# Patient Record
Sex: FEMALE | Race: BLACK OR AFRICAN AMERICAN | URBAN - METROPOLITAN AREA
[De-identification: names, ages, dates, MRNs, and addresses within clinical notes are randomized per-mention and may not be internally consistent; named-entity substitution may affect disease eponyms.]

---

## 2022-10-04 PROCEDURE — 36415 COLL VENOUS BLD VENIPUNCTURE: CPT

## 2022-10-04 PROCEDURE — 99284 EMERGENCY DEPT VISIT MOD MDM: CPT

## 2022-10-05 ENCOUNTER — APPOINTMENT (OUTPATIENT)
Dept: ULTRASOUND IMAGING | Age: 37
End: 2022-10-05
Payer: MEDICAID

## 2022-10-05 ENCOUNTER — HOSPITAL ENCOUNTER (EMERGENCY)
Age: 37
Discharge: HOME | End: 2022-10-05
Attending: EMERGENCY MEDICINE
Payer: MEDICAID

## 2022-10-05 VITALS
HEIGHT: 65 IN | WEIGHT: 228 LBS | RESPIRATION RATE: 16 BRPM | SYSTOLIC BLOOD PRESSURE: 103 MMHG | TEMPERATURE: 97 F | HEART RATE: 88 BPM | DIASTOLIC BLOOD PRESSURE: 65 MMHG | OXYGEN SATURATION: 98 % | BODY MASS INDEX: 37.99 KG/M2

## 2022-10-05 DIAGNOSIS — N93.9 VAGINAL BLEEDING: Primary | ICD-10-CM

## 2022-10-05 LAB
ALBUMIN SERPL-MCNC: 3.5 G/DL (ref 3.4–5)
ALBUMIN/GLOB SERPL: 1 {RATIO} (ref 1–2)
ALP LIVER SERPL-CCNC: 76 U/L
ALT SERPL-CCNC: 21 U/L
ANION GAP SERPL CALC-SCNC: 1 MMOL/L (ref 0–18)
AST SERPL-CCNC: 12 U/L (ref 15–37)
B-HCG SERPL-ACNC: <1 MIU/ML
BASOPHILS # BLD AUTO: 0.03 X10(3) UL (ref 0–0.2)
BASOPHILS NFR BLD AUTO: 0.4 %
BILIRUB SERPL-MCNC: 0.3 MG/DL (ref 0.1–2)
BILIRUB UR QL STRIP.AUTO: NEGATIVE
BUN BLD-MCNC: 11 MG/DL (ref 7–18)
CALCIUM BLD-MCNC: 9.2 MG/DL (ref 8.5–10.1)
CHLORIDE SERPL-SCNC: 106 MMOL/L (ref 98–112)
CLARITY UR REFRACT.AUTO: CLEAR
CO2 SERPL-SCNC: 31 MMOL/L (ref 21–32)
COLOR UR AUTO: YELLOW
CREAT BLD-MCNC: 0.72 MG/DL
EOSINOPHIL # BLD AUTO: 0.1 X10(3) UL (ref 0–0.7)
EOSINOPHIL NFR BLD AUTO: 1.5 %
ERYTHROCYTE [DISTWIDTH] IN BLOOD BY AUTOMATED COUNT: 13.2 %
GFR SERPLBLD BASED ON 1.73 SQ M-ARVRAT: 109 ML/MIN/1.73M2 (ref 60–?)
GLOBULIN PLAS-MCNC: 3.5 G/DL (ref 2.8–4.4)
GLUCOSE BLD-MCNC: 125 MG/DL (ref 70–99)
GLUCOSE UR STRIP.AUTO-MCNC: NEGATIVE MG/DL
HCT VFR BLD AUTO: 37.7 %
HGB BLD-MCNC: 12.1 G/DL
IMM GRANULOCYTES # BLD AUTO: 0.01 X10(3) UL (ref 0–1)
IMM GRANULOCYTES NFR BLD: 0.1 %
KETONES UR STRIP.AUTO-MCNC: NEGATIVE MG/DL
LEUKOCYTE ESTERASE UR QL STRIP.AUTO: NEGATIVE
LYMPHOCYTES # BLD AUTO: 3.03 X10(3) UL (ref 1–4)
LYMPHOCYTES NFR BLD AUTO: 44.3 %
MCH RBC QN AUTO: 25.7 PG (ref 26–34)
MCHC RBC AUTO-ENTMCNC: 32.1 G/DL (ref 31–37)
MCV RBC AUTO: 80.2 FL
MONOCYTES # BLD AUTO: 0.52 X10(3) UL (ref 0.1–1)
MONOCYTES NFR BLD AUTO: 7.6 %
NEUTROPHILS # BLD AUTO: 3.15 X10 (3) UL (ref 1.5–7.7)
NEUTROPHILS # BLD AUTO: 3.15 X10(3) UL (ref 1.5–7.7)
NEUTROPHILS NFR BLD AUTO: 46.1 %
NITRITE UR QL STRIP.AUTO: NEGATIVE
OSMOLALITY SERPL CALC.SUM OF ELEC: 287 MOSM/KG (ref 275–295)
PH UR STRIP.AUTO: 7 [PH] (ref 5–8)
PLATELET # BLD AUTO: 251 10(3)UL (ref 150–450)
POTASSIUM SERPL-SCNC: 4.1 MMOL/L (ref 3.5–5.1)
PROT SERPL-MCNC: 7 G/DL (ref 6.4–8.2)
PROT UR STRIP.AUTO-MCNC: NEGATIVE MG/DL
RBC # BLD AUTO: 4.7 X10(6)UL
RBC #/AREA URNS AUTO: >10 /HPF
RH BLOOD TYPE: POSITIVE
SODIUM SERPL-SCNC: 138 MMOL/L (ref 136–145)
SP GR UR STRIP.AUTO: 1.02 (ref 1–1.03)
UROBILINOGEN UR STRIP.AUTO-MCNC: 0.2 MG/DL
WBC # BLD AUTO: 6.8 X10(3) UL (ref 4–11)

## 2022-10-05 PROCEDURE — 81015 MICROSCOPIC EXAM OF URINE: CPT

## 2022-10-05 PROCEDURE — 76801 OB US < 14 WKS SINGLE FETUS: CPT

## 2022-10-05 PROCEDURE — 86900 BLOOD TYPING SEROLOGIC ABO: CPT | Performed by: EMERGENCY MEDICINE

## 2022-10-05 PROCEDURE — 76817 TRANSVAGINAL US OBSTETRIC: CPT

## 2022-10-05 PROCEDURE — 86901 BLOOD TYPING SEROLOGIC RH(D): CPT | Performed by: EMERGENCY MEDICINE

## 2022-10-05 PROCEDURE — 81001 URINALYSIS AUTO W/SCOPE: CPT

## 2022-10-05 PROCEDURE — 85025 COMPLETE CBC W/AUTO DIFF WBC: CPT | Performed by: EMERGENCY MEDICINE

## 2022-10-05 PROCEDURE — 84702 CHORIONIC GONADOTROPIN TEST: CPT | Performed by: EMERGENCY MEDICINE

## 2022-10-05 PROCEDURE — 81001 URINALYSIS AUTO W/SCOPE: CPT | Performed by: EMERGENCY MEDICINE

## 2022-10-05 PROCEDURE — 80053 COMPREHEN METABOLIC PANEL: CPT | Performed by: EMERGENCY MEDICINE

## 2022-10-05 NOTE — ED INITIAL ASSESSMENT (HPI)
Pt to ed with c/o vaginal bleeding; blood noted when wiping, and  right quadrant pain. Pt may be 4-5 weeks pregnant.    PT with hx of ectopic

## 2022-10-27 ENCOUNTER — APPOINTMENT (OUTPATIENT)
Dept: CT IMAGING | Age: 37
End: 2022-10-27
Attending: EMERGENCY MEDICINE
Payer: MEDICAID

## 2022-10-27 ENCOUNTER — HOSPITAL ENCOUNTER (EMERGENCY)
Age: 37
Discharge: HOME | End: 2022-10-27
Attending: EMERGENCY MEDICINE
Payer: MEDICAID

## 2022-10-27 ENCOUNTER — APPOINTMENT (OUTPATIENT)
Dept: ULTRASOUND IMAGING | Age: 37
End: 2022-10-27
Attending: PHYSICIAN ASSISTANT
Payer: MEDICAID

## 2022-10-27 VITALS
DIASTOLIC BLOOD PRESSURE: 79 MMHG | HEART RATE: 79 BPM | BODY MASS INDEX: 37.49 KG/M2 | OXYGEN SATURATION: 99 % | SYSTOLIC BLOOD PRESSURE: 118 MMHG | WEIGHT: 225 LBS | RESPIRATION RATE: 18 BRPM | HEIGHT: 65 IN | TEMPERATURE: 98 F

## 2022-10-27 DIAGNOSIS — R10.9 ABDOMINAL PAIN OF UNKNOWN ETIOLOGY: Primary | ICD-10-CM

## 2022-10-27 LAB
ALBUMIN SERPL-MCNC: 3.8 G/DL (ref 3.4–5)
ALBUMIN/GLOB SERPL: 1 {RATIO} (ref 1–2)
ALP LIVER SERPL-CCNC: 71 U/L
ALT SERPL-CCNC: 21 U/L
ANION GAP SERPL CALC-SCNC: 7 MMOL/L (ref 0–18)
AST SERPL-CCNC: 14 U/L (ref 15–37)
B-HCG UR QL: NEGATIVE
BASOPHILS # BLD AUTO: 0.03 X10(3) UL (ref 0–0.2)
BASOPHILS NFR BLD AUTO: 0.4 %
BILIRUB SERPL-MCNC: 0.3 MG/DL (ref 0.1–2)
BILIRUB UR QL STRIP.AUTO: NEGATIVE
BUN BLD-MCNC: 13 MG/DL (ref 7–18)
CALCIUM BLD-MCNC: 9.5 MG/DL (ref 8.5–10.1)
CHLORIDE SERPL-SCNC: 104 MMOL/L (ref 98–112)
CLARITY UR REFRACT.AUTO: CLEAR
CO2 SERPL-SCNC: 26 MMOL/L (ref 21–32)
COLOR UR AUTO: YELLOW
CREAT BLD-MCNC: 0.86 MG/DL
EOSINOPHIL # BLD AUTO: 0.06 X10(3) UL (ref 0–0.7)
EOSINOPHIL NFR BLD AUTO: 0.7 %
ERYTHROCYTE [DISTWIDTH] IN BLOOD BY AUTOMATED COUNT: 13.2 %
GFR SERPLBLD BASED ON 1.73 SQ M-ARVRAT: 88 ML/MIN/1.73M2 (ref 60–?)
GLOBULIN PLAS-MCNC: 3.8 G/DL (ref 2.8–4.4)
GLUCOSE BLD-MCNC: 91 MG/DL (ref 70–99)
GLUCOSE UR STRIP.AUTO-MCNC: NEGATIVE MG/DL
HCT VFR BLD AUTO: 39.2 %
HGB BLD-MCNC: 12.7 G/DL
IMM GRANULOCYTES # BLD AUTO: 0.04 X10(3) UL (ref 0–1)
IMM GRANULOCYTES NFR BLD: 0.5 %
KETONES UR STRIP.AUTO-MCNC: NEGATIVE MG/DL
LEUKOCYTE ESTERASE UR QL STRIP.AUTO: NEGATIVE
LIPASE SERPL-CCNC: 215 U/L (ref 73–393)
LYMPHOCYTES # BLD AUTO: 3.68 X10(3) UL (ref 1–4)
LYMPHOCYTES NFR BLD AUTO: 43.2 %
MCH RBC QN AUTO: 25.9 PG (ref 26–34)
MCHC RBC AUTO-ENTMCNC: 32.4 G/DL (ref 31–37)
MCV RBC AUTO: 80 FL
MONOCYTES # BLD AUTO: 0.73 X10(3) UL (ref 0.1–1)
MONOCYTES NFR BLD AUTO: 8.6 %
NEUTROPHILS # BLD AUTO: 3.97 X10 (3) UL (ref 1.5–7.7)
NEUTROPHILS # BLD AUTO: 3.97 X10(3) UL (ref 1.5–7.7)
NEUTROPHILS NFR BLD AUTO: 46.6 %
NITRITE UR QL STRIP.AUTO: NEGATIVE
OSMOLALITY SERPL CALC.SUM OF ELEC: 284 MOSM/KG (ref 275–295)
PH UR STRIP.AUTO: 6.5 [PH] (ref 5–8)
PLATELET # BLD AUTO: 269 10(3)UL (ref 150–450)
POTASSIUM SERPL-SCNC: 3.9 MMOL/L (ref 3.5–5.1)
PROT SERPL-MCNC: 7.6 G/DL (ref 6.4–8.2)
PROT UR STRIP.AUTO-MCNC: NEGATIVE MG/DL
RBC # BLD AUTO: 4.9 X10(6)UL
RBC UR QL AUTO: NEGATIVE
SODIUM SERPL-SCNC: 137 MMOL/L (ref 136–145)
SP GR UR STRIP.AUTO: 1.02 (ref 1–1.03)
UROBILINOGEN UR STRIP.AUTO-MCNC: 0.2 MG/DL
WBC # BLD AUTO: 8.5 X10(3) UL (ref 4–11)

## 2022-10-27 PROCEDURE — 76700 US EXAM ABDOM COMPLETE: CPT | Performed by: PHYSICIAN ASSISTANT

## 2022-10-27 PROCEDURE — 99284 EMERGENCY DEPT VISIT MOD MDM: CPT

## 2022-10-27 PROCEDURE — 81003 URINALYSIS AUTO W/O SCOPE: CPT

## 2022-10-27 PROCEDURE — 80053 COMPREHEN METABOLIC PANEL: CPT | Performed by: PHYSICIAN ASSISTANT

## 2022-10-27 PROCEDURE — 74177 CT ABD & PELVIS W/CONTRAST: CPT | Performed by: EMERGENCY MEDICINE

## 2022-10-27 PROCEDURE — 81003 URINALYSIS AUTO W/O SCOPE: CPT | Performed by: EMERGENCY MEDICINE

## 2022-10-27 PROCEDURE — 96361 HYDRATE IV INFUSION ADD-ON: CPT

## 2022-10-27 PROCEDURE — 83690 ASSAY OF LIPASE: CPT | Performed by: PHYSICIAN ASSISTANT

## 2022-10-27 PROCEDURE — 81025 URINE PREGNANCY TEST: CPT

## 2022-10-27 PROCEDURE — 96374 THER/PROPH/DIAG INJ IV PUSH: CPT

## 2022-10-27 PROCEDURE — 85025 COMPLETE CBC W/AUTO DIFF WBC: CPT | Performed by: PHYSICIAN ASSISTANT

## 2022-10-27 RX ORDER — DICYCLOMINE HCL 20 MG
20 TABLET ORAL 4 TIMES DAILY PRN
Qty: 15 TABLET | Refills: 0 | Status: SHIPPED | OUTPATIENT
Start: 2022-10-27 | End: 2022-10-29

## 2022-10-27 RX ORDER — MORPHINE SULFATE 4 MG/ML
4 INJECTION, SOLUTION INTRAMUSCULAR; INTRAVENOUS ONCE
Status: COMPLETED | OUTPATIENT
Start: 2022-10-27 | End: 2022-10-27

## 2022-10-27 NOTE — ED INITIAL ASSESSMENT (HPI)
Pt presents to ed with c/o RLQ pain for the past 3 weeks. States it has progressively getting worse and was so bad today \"it brought me to my knees. \" denies n/v/d. Also reports urinary frequency.

## 2022-10-28 NOTE — DISCHARGE INSTRUCTIONS
Tylenol or Advil for as needed for pain take the Bentyl as needed.   Follow-up with your doctor over the next 2 days drink plenty fluids return if worse peer

## 2023-04-03 ENCOUNTER — OFFICE VISIT (OUTPATIENT)
Dept: FAMILY MEDICINE CLINIC | Facility: CLINIC | Age: 38
End: 2023-04-03
Payer: MEDICAID

## 2023-04-03 VITALS
SYSTOLIC BLOOD PRESSURE: 116 MMHG | HEIGHT: 63.78 IN | BODY MASS INDEX: 35.95 KG/M2 | DIASTOLIC BLOOD PRESSURE: 78 MMHG | WEIGHT: 208 LBS | TEMPERATURE: 97 F | HEART RATE: 98 BPM | OXYGEN SATURATION: 99 %

## 2023-04-03 DIAGNOSIS — Z01.419 ENCOUNTER FOR ROUTINE GYNECOLOGICAL EXAMINATION WITH PAPANICOLAOU SMEAR OF CERVIX: Primary | ICD-10-CM

## 2023-04-03 DIAGNOSIS — Z80.0 FAMILY HISTORY OF COLON CANCER: ICD-10-CM

## 2023-04-03 DIAGNOSIS — Z11.51 SCREENING FOR HPV (HUMAN PAPILLOMAVIRUS): ICD-10-CM

## 2023-04-03 DIAGNOSIS — E66.01 CLASS 2 SEVERE OBESITY DUE TO EXCESS CALORIES WITH SERIOUS COMORBIDITY AND BODY MASS INDEX (BMI) OF 35.0 TO 35.9 IN ADULT (HCC): ICD-10-CM

## 2023-04-03 DIAGNOSIS — Z12.4 SCREENING FOR CERVICAL CANCER: ICD-10-CM

## 2023-04-03 DIAGNOSIS — K76.0 FATTY LIVER: ICD-10-CM

## 2023-04-03 DIAGNOSIS — Z00.00 ROUTINE GENERAL MEDICAL EXAMINATION AT A HEALTH CARE FACILITY: ICD-10-CM

## 2023-04-03 PROBLEM — Z98.890 HISTORY OF LOOP ELECTRICAL EXCISION PROCEDURE (LEEP): Status: RESOLVED | Noted: 2023-04-03 | Resolved: 2023-04-03

## 2023-04-03 PROBLEM — Z98.890 HISTORY OF LOOP ELECTRICAL EXCISION PROCEDURE (LEEP): Status: ACTIVE | Noted: 2023-04-03

## 2023-04-03 PROBLEM — E66.812 CLASS 2 SEVERE OBESITY DUE TO EXCESS CALORIES WITH SERIOUS COMORBIDITY AND BODY MASS INDEX (BMI) OF 35.0 TO 35.9 IN ADULT (HCC): Status: ACTIVE | Noted: 2023-04-03

## 2023-04-03 PROCEDURE — 3078F DIAST BP <80 MM HG: CPT | Performed by: FAMILY MEDICINE

## 2023-04-03 PROCEDURE — 3074F SYST BP LT 130 MM HG: CPT | Performed by: FAMILY MEDICINE

## 2023-04-03 PROCEDURE — 3008F BODY MASS INDEX DOCD: CPT | Performed by: FAMILY MEDICINE

## 2023-04-03 PROCEDURE — 87624 HPV HI-RISK TYP POOLED RSLT: CPT | Performed by: FAMILY MEDICINE

## 2023-04-03 PROCEDURE — 99385 PREV VISIT NEW AGE 18-39: CPT | Performed by: FAMILY MEDICINE

## 2023-04-04 LAB — HPV I/H RISK 1 DNA SPEC QL NAA+PROBE: NEGATIVE

## 2023-06-15 ENCOUNTER — OFFICE VISIT (OUTPATIENT)
Dept: GASTROENTEROLOGY | Facility: CLINIC | Age: 38
End: 2023-06-15

## 2023-06-15 VITALS
WEIGHT: 209 LBS | BODY MASS INDEX: 37.03 KG/M2 | HEART RATE: 101 BPM | SYSTOLIC BLOOD PRESSURE: 129 MMHG | DIASTOLIC BLOOD PRESSURE: 87 MMHG | HEIGHT: 63 IN

## 2023-06-15 DIAGNOSIS — R10.11 RUQ PAIN: ICD-10-CM

## 2023-06-15 DIAGNOSIS — R93.89 ABNORMAL ULTRASOUND: ICD-10-CM

## 2023-06-15 DIAGNOSIS — K59.00 CONSTIPATION, UNSPECIFIED CONSTIPATION TYPE: Primary | ICD-10-CM

## 2023-06-15 PROCEDURE — 3074F SYST BP LT 130 MM HG: CPT | Performed by: NURSE PRACTITIONER

## 2023-06-15 PROCEDURE — 99244 OFF/OP CNSLTJ NEW/EST MOD 40: CPT | Performed by: NURSE PRACTITIONER

## 2023-06-15 PROCEDURE — 3008F BODY MASS INDEX DOCD: CPT | Performed by: NURSE PRACTITIONER

## 2023-06-15 PROCEDURE — 3079F DIAST BP 80-89 MM HG: CPT | Performed by: NURSE PRACTITIONER

## 2023-06-15 RX ORDER — MULTIVITAMIN
TABLET ORAL
COMMUNITY

## 2023-06-15 RX ORDER — DICYCLOMINE HCL 20 MG
TABLET ORAL
COMMUNITY

## 2023-06-15 RX ORDER — ALBUTEROL SULFATE 90 UG/1
AEROSOL, METERED RESPIRATORY (INHALATION)
COMMUNITY

## 2023-06-15 RX ORDER — PHENTERMINE HYDROCHLORIDE 37.5 MG/1
TABLET ORAL
COMMUNITY
Start: 2023-04-13

## 2023-06-15 RX ORDER — DESOGESTREL AND ETHINYL ESTRADIOL 0.15-0.03
KIT ORAL
COMMUNITY

## 2023-06-15 RX ORDER — PANTOPRAZOLE SODIUM 40 MG/1
40 TABLET, DELAYED RELEASE ORAL DAILY
Qty: 30 TABLET | Refills: 5 | Status: SHIPPED | OUTPATIENT
Start: 2023-06-15 | End: 2023-07-15

## 2023-06-15 RX ORDER — IBUPROFEN 800 MG
TABLET ORAL
COMMUNITY

## 2023-07-02 ENCOUNTER — HOSPITAL ENCOUNTER (OUTPATIENT)
Dept: MRI IMAGING | Age: 38
End: 2023-07-02
Attending: NURSE PRACTITIONER
Payer: MEDICAID

## 2023-07-02 ENCOUNTER — HOSPITAL ENCOUNTER (OUTPATIENT)
Dept: MRI IMAGING | Age: 38
Discharge: HOME | End: 2023-07-02
Attending: NURSE PRACTITIONER
Payer: MEDICAID

## 2023-07-02 DIAGNOSIS — R10.11 RUQ PAIN: ICD-10-CM

## 2023-07-02 DIAGNOSIS — R93.89 ABNORMAL ULTRASOUND: ICD-10-CM

## 2023-07-02 PROCEDURE — 74183 MRI ABD W/O CNTR FLWD CNTR: CPT | Performed by: NURSE PRACTITIONER

## 2023-07-02 PROCEDURE — A9581 GADOXETATE DISODIUM INJ: HCPCS | Performed by: NURSE PRACTITIONER

## 2023-07-05 ENCOUNTER — PATIENT MESSAGE (OUTPATIENT)
Dept: GASTROENTEROLOGY | Facility: CLINIC | Age: 38
End: 2023-07-05

## 2023-07-05 NOTE — TELEPHONE ENCOUNTER
From: Matilda Mantilla  To: Shila Renee.  Federico Burden  Sent: 7/5/2023 5:35 PM CDT  Subject: Urgent     Hello I'm not sure if Im suppose message you or Ms David Kan but can someone please reach out to me in regards to my results from my MRI I would greatly appreciate it thank you

## 2023-07-11 ENCOUNTER — TELEPHONE (OUTPATIENT)
Facility: CLINIC | Age: 38
End: 2023-07-11

## 2023-07-11 NOTE — TELEPHONE ENCOUNTER
Message sent to pt outreach to repeat US per APN recommendations below. Nursing:  Please place recall for ultrasound liver 4 mos 11/2023.      Thanks,  [facility]

## 2023-07-11 NOTE — TELEPHONE ENCOUNTER
Mri abdomen 7/2/23    Impression  CONCLUSION:    1. There is a smoothly marginated enhancing nodule within the lateral segment of the left lobe anteriorly measuring 1.5 x 1.4 x 1.7 cm. The lesion retains contrast into the 20 minutes delayed hepatocellular phase imaging and is consistent with a benign  process, most likely representing either a benign adenoma or FNH. Please see the body of the report above for further description regarding this lesion. 2. No additional hepatic lesions. No evidence of fatty infiltration or hepatocellular disease. LOCATION:  [facility]        Dictated by (CST): Ilana Page, DO on 7/03/2023   at 8:22 AM       Lesion consistent with a benign  process, most likely representing either a benign adenoma or FNH. Lfts normal 10/27/22. Repeat ordered, but she has not gone to have labs drawn. She reports on-going intermittent ruq as well as luq pain. Pain can be post-prandial, working out, at random. She has increased fiber in her diet and has been having bm every 1-2 days. She is not sure if pain effected by bm. She has been on birth control x last 15 years and stopped last year in July. Recommend:  Labs  Hp testing  Use miralax along with fiber supplement for constipation  Plan for surveillance imaging in approx 4 mos and rtc  Consider need for further testing/referral pending results of testing and sx       She verbalizes understanding and is in agreement with the plan.

## 2023-07-27 ENCOUNTER — LAB ENCOUNTER (OUTPATIENT)
Dept: LAB | Age: 38
End: 2023-07-27
Attending: NURSE PRACTITIONER
Payer: MEDICAID

## 2023-07-27 DIAGNOSIS — K76.0 FATTY LIVER: ICD-10-CM

## 2023-07-27 DIAGNOSIS — R10.11 RUQ PAIN: ICD-10-CM

## 2023-07-27 DIAGNOSIS — E66.01 CLASS 2 SEVERE OBESITY DUE TO EXCESS CALORIES WITH SERIOUS COMORBIDITY AND BODY MASS INDEX (BMI) OF 35.0 TO 35.9 IN ADULT: ICD-10-CM

## 2023-07-27 DIAGNOSIS — K59.00 CONSTIPATION, UNSPECIFIED CONSTIPATION TYPE: ICD-10-CM

## 2023-07-27 LAB
ALBUMIN SERPL-MCNC: 3.9 G/DL (ref 3.4–5)
ALBUMIN/GLOB SERPL: 1 {RATIO} (ref 1–2)
ALP LIVER SERPL-CCNC: 78 U/L
ALT SERPL-CCNC: 26 U/L
ANION GAP SERPL CALC-SCNC: 4 MMOL/L (ref 0–18)
AST SERPL-CCNC: 15 U/L (ref 15–37)
BASOPHILS # BLD AUTO: 0.03 X10(3) UL (ref 0–0.2)
BASOPHILS NFR BLD AUTO: 0.5 %
BILIRUB SERPL-MCNC: 0.3 MG/DL (ref 0.1–2)
BUN BLD-MCNC: 17 MG/DL (ref 7–18)
CALCIUM BLD-MCNC: 9.3 MG/DL (ref 8.5–10.1)
CHLORIDE SERPL-SCNC: 106 MMOL/L (ref 98–112)
CHOLEST SERPL-MCNC: 260 MG/DL (ref ?–200)
CO2 SERPL-SCNC: 28 MMOL/L (ref 21–32)
CREAT BLD-MCNC: 0.94 MG/DL
EGFRCR SERPLBLD CKD-EPI 2021: 79 ML/MIN/1.73M2 (ref 60–?)
EOSINOPHIL # BLD AUTO: 0.06 X10(3) UL (ref 0–0.7)
EOSINOPHIL NFR BLD AUTO: 1 %
ERYTHROCYTE [DISTWIDTH] IN BLOOD BY AUTOMATED COUNT: 12.7 %
FASTING PATIENT LIPID ANSWER: YES
FASTING STATUS PATIENT QL REPORTED: YES
GLOBULIN PLAS-MCNC: 3.8 G/DL (ref 2.8–4.4)
GLUCOSE BLD-MCNC: 97 MG/DL (ref 70–99)
HCT VFR BLD AUTO: 39.7 %
HDLC SERPL-MCNC: 61 MG/DL (ref 40–59)
HGB BLD-MCNC: 12.5 G/DL
IMM GRANULOCYTES # BLD AUTO: 0.01 X10(3) UL (ref 0–1)
IMM GRANULOCYTES NFR BLD: 0.2 %
LDLC SERPL CALC-MCNC: 186 MG/DL (ref ?–100)
LYMPHOCYTES # BLD AUTO: 2.8 X10(3) UL (ref 1–4)
LYMPHOCYTES NFR BLD AUTO: 48.5 %
MCH RBC QN AUTO: 25.3 PG (ref 26–34)
MCHC RBC AUTO-ENTMCNC: 31.5 G/DL (ref 31–37)
MCV RBC AUTO: 80.4 FL
MONOCYTES # BLD AUTO: 0.39 X10(3) UL (ref 0.1–1)
MONOCYTES NFR BLD AUTO: 6.8 %
NEUTROPHILS # BLD AUTO: 2.48 X10 (3) UL (ref 1.5–7.7)
NEUTROPHILS # BLD AUTO: 2.48 X10(3) UL (ref 1.5–7.7)
NEUTROPHILS NFR BLD AUTO: 43 %
NONHDLC SERPL-MCNC: 199 MG/DL (ref ?–130)
OSMOLALITY SERPL CALC.SUM OF ELEC: 287 MOSM/KG (ref 275–295)
PLATELET # BLD AUTO: 275 10(3)UL (ref 150–450)
POTASSIUM SERPL-SCNC: 4.2 MMOL/L (ref 3.5–5.1)
PROT SERPL-MCNC: 7.7 G/DL (ref 6.4–8.2)
RBC # BLD AUTO: 4.94 X10(6)UL
SODIUM SERPL-SCNC: 138 MMOL/L (ref 136–145)
TRIGL SERPL-MCNC: 76 MG/DL (ref 30–149)
TSI SER-ACNC: 0.82 MIU/ML (ref 0.36–3.74)
VLDLC SERPL CALC-MCNC: 16 MG/DL (ref 0–30)
WBC # BLD AUTO: 5.8 X10(3) UL (ref 4–11)

## 2023-07-27 PROCEDURE — 36415 COLL VENOUS BLD VENIPUNCTURE: CPT

## 2023-07-27 PROCEDURE — 80061 LIPID PANEL: CPT

## 2023-07-27 PROCEDURE — 84443 ASSAY THYROID STIM HORMONE: CPT

## 2023-07-27 PROCEDURE — 85025 COMPLETE CBC W/AUTO DIFF WBC: CPT

## 2023-07-27 PROCEDURE — 80053 COMPREHEN METABOLIC PANEL: CPT

## 2023-07-27 PROCEDURE — 83013 H PYLORI (C-13) BREATH: CPT

## 2023-07-29 LAB — H PYLORI BREATH TEST: NEGATIVE

## 2023-08-01 ENCOUNTER — PATIENT MESSAGE (OUTPATIENT)
Dept: GASTROENTEROLOGY | Facility: CLINIC | Age: 38
End: 2023-08-01

## 2023-08-01 NOTE — TELEPHONE ENCOUNTER
From: Rolanda Valdez  To: Ronel Dukes  Sent: 8/1/2023 2:51 PM CDT  Subject: Explanation Needed    Hello can someone please give me a call in regards to my recent blood work and test results I would like them to be verbally explained to me please thanks in advance   Mohamud Quiles [phone]

## 2023-08-01 NOTE — TELEPHONE ENCOUNTER
Aguilar Maria Teresa asking for a call regarding lab results, they are all WNL. Do you want anything other testing done? Do you want me to call pt/relay any further instructions to her?   Thanks,  Tasha Lopez  Clinical Staff (supporting KEEGAN Naqvi)3 hours ago (2:51 PM)     ES  Hello can someone please give me a call in regards to my recent blood work and test results I would like them to be verbally explained to me please thanks in advance   Eric Smith [phone]

## 2023-08-02 ENCOUNTER — PATIENT MESSAGE (OUTPATIENT)
Dept: FAMILY MEDICINE CLINIC | Facility: CLINIC | Age: 38
End: 2023-08-02

## 2023-08-02 ENCOUNTER — TELEPHONE (OUTPATIENT)
Dept: GASTROENTEROLOGY | Facility: CLINIC | Age: 38
End: 2023-08-02

## 2023-08-02 NOTE — TELEPHONE ENCOUNTER
Regarding: Explanation Needed  Contact: [phone]  ----- Message from Madhavi Painter RN sent at 8/1/2023  6:06 PM CDT -----       ----- Message from Lori Singh to KEEGAN Nichols sent at 8/1/2023  2:51 PM -----   Hello can someone please give me a call in regards to my recent blood work and test results I would like them to be verbally explained to me please thanks in advance   Jone Alba [phone]

## 2023-08-02 NOTE — TELEPHONE ENCOUNTER
Pt contacted regarding labs. Hp testing neg. Cbc, cmp, tsh unremarkable. Advised low fat diet, activity, healthy bmi, monitor cholesterol with pcp. She has identified dairy as a trigger of bloating/gas and we discussed limiting this in her diet, use of lactaid. Advised trying miralax+fiber for constipation, consider probiotic such as align. Ultimately, recommend rtc for on-going symptoms to consider need for endoscopy.

## 2023-08-30 ENCOUNTER — TELEMEDICINE (OUTPATIENT)
Dept: FAMILY MEDICINE CLINIC | Facility: CLINIC | Age: 38
End: 2023-08-30

## 2023-08-30 DIAGNOSIS — F52.31 ANORGASMIA OF FEMALE: ICD-10-CM

## 2023-08-30 DIAGNOSIS — F43.22 ACUTE ADJUSTMENT DISORDER WITH ANXIETY: ICD-10-CM

## 2023-08-30 DIAGNOSIS — E78.00 HYPERCHOLESTEROLEMIA: Primary | ICD-10-CM

## 2023-08-30 PROBLEM — R92.8 ABNORMAL MAMMOGRAM: Status: ACTIVE | Noted: 2019-11-04

## 2023-08-30 PROCEDURE — 99214 OFFICE O/P EST MOD 30 MIN: CPT | Performed by: FAMILY MEDICINE

## 2023-08-30 RX ORDER — CALCIUM POLYCARBOPHIL 625 MG
TABLET ORAL
COMMUNITY

## 2023-08-30 RX ORDER — CALCIUM POLYCARBOPHIL 625 MG 625 MG/1
625 TABLET ORAL DAILY
COMMUNITY

## 2023-08-30 RX ORDER — PANTOPRAZOLE SODIUM 40 MG/1
TABLET, DELAYED RELEASE ORAL
COMMUNITY
Start: 2023-08-23

## 2023-08-30 RX ORDER — POLYETHYLENE GLYCOL 3350 17 G/17G
17 POWDER, FOR SOLUTION ORAL DAILY
COMMUNITY

## 2023-08-30 NOTE — PROGRESS NOTES
Chart video visit with live interactive synchronous audio and video    Rolanda Valdez verbally consents to a Chart video visit with live interactive synchronous audio and video on 08/30/23. Patient has been referred to the [facility] website at [email or web address] to review the yearly Consent to Treat document. Patient understands and accepts financial responsibility for any deductible, co-insurance and/or co-pays associated with this service. Please note that the following visit was completed using two-way, real-time interactive audio and/or video communication. This has been done in good patricia to provide continuity of care in the best interest of the provider-patient relationship, due to the ongoing public health crisis/national emergency and because of restrictions of visitation. There are limitations of this visit as no physical exam could be performed. Every conscious effort was taken to allow for sufficient and adequate time. This billing was spent on reviewing labs, medications, radiology tests and decision making. Appropriate medical decision-making and tests are ordered as detailed in the plan of care above. Rolanda Valdez is a 34year old female. HPI:       Hypercholesterolemia:  Denies fam h/o MI or stroke. Walking 3-4 miles 3-4 days per week. Anxiety:  Pt states anxiety is from being wrongfully terminated. 1. Little interest or pleasure in doing things: Not at all  2. Feeling down, depressed, or hopeless: More than half the days  3. Trouble falling or staying asleep, or sleeping too much: Nearly every day  4. Feeling tired or having little energy: Nearly every day  5. Poor appetite or overeating: More than half the days  6. Feeling bad about yourself - or that you are a failure or have let yourself or your family down: Several days  7. Trouble concentrating on things, such as reading the newspaper or watching television: Nearly every day  8.  Moving or speaking so slowly that other people could have noticed. Or the opposite - being so fidgety or restless that you have been moving around a lot more than usual: Not at all  9. Thoughts that you would be better off dead, or of hurting yourself in some way: Not at all  PHQ-9 TOTAL SCORE: 14  If you checked off any problems, how difficult have these problems made it for you to do your work, take care of things at home, or get along with other people?: Somewhat difficult            Asthma:  Uses albuterol as needed with good relief. Very infrequently uses the albuterol. 8/30/2023   Asthma Flowsheet ([place])    ACT Score 25            Anorgasmia:  Patient reports difficulty with attaining orgasm. This has been a chronic problem. She has not seen a healthcare professional for this in the past.          Current Outpatient Medications   Medication Sig Dispense Refill    pantoprazole 40 MG Oral Tab EC  (Patient not taking: Reported on 9/6/2023)      potassium and sodium phosphates 280-160-250 MG Oral Powd Pack Take 1 packet by mouth once. MILK THISTLE OR Take by mouth. NON FORMULARY Adan Red      polyethylene glycol, PEG 3350, 17 g Oral Powd Pack Take 17 g by mouth daily. Turmeric (QC TUMERIC COMPLEX OR) Take by mouth. Calcium Polycarbophil (FIBER) 625 MG Oral Tab Take by mouth.      polycarbophil 625 MG Oral Tab Take 1 tablet (625 mg total) by mouth daily. albuterol 108 (90 Base) MCG/ACT Inhalation Aero Soln albuterol sulfate HFA 90 mcg/actuation aerosol inhaler   INHALE 2 PUFFS BY MOUTH EVERY 4 HOURS AS NEEDED (Patient not taking: Reported on 9/6/2023)      Cholecalciferol (VITAMIN D3) 10 MCG (400 UNIT) Oral Cap Take by mouth. Multiple Vitamin (MULTI-VITAMIN DAILY) Oral Tab Take by mouth. Elderberry 500 MG Oral Cap Take by mouth.         Patient Active Problem List:     Fatty liver     Class 2 severe obesity due to excess calories with serious comorbidity and body mass index (BMI) of 35.0 to 35.9 in adult      Family history of colon cancer     Radial styloid tenosynovitis     Ectopic pregnancy     Asthma     Anemia     Abnormal mammogram     Allergic rhinitis     Abnormal Pap smear of cervix     Past Medical History:   Diagnosis Date    Ectopic pregnancy 2014    History of loop electrical excision procedure (LEEP) 4/3/2023    2014    History of multiple miscarriages     3      Social History:  Social History     Socioeconomic History    Marital status: Legally    Tobacco Use    Smoking status: Never    Smokeless tobacco: Never   Vaping Use    Vaping Use: Never used   Substance and Sexual Activity    Alcohol use: Not Currently    Drug use: Never   Other Topics Concern     Service No    Blood Transfusions No    Caffeine Concern Yes    Occupational Exposure No    Hobby Hazards No    Sleep Concern No    Stress Concern No    Weight Concern No    Special Diet Yes    Back Care No    Exercise Yes    Bike Helmet No    Seat Belt Yes    Self-Exams No        REVIEW OF SYSTEMS:   GENERAL: Overall feels well otherwise  LUNGS: Denies cough or shortness of breath. Denies shortness of breath with exertion  CARDIOVASCULAR: Denies chest pain or palpitations. Denies chest pain with exertion  GI: Denies abdominal pain, nausea, vomiting, or diarrhea  NEURO: Denies headache or dizziness. PSYCH: Denies SI/HI      EXAM:   LMP 08/15/2023 (Approximate)   GENERAL: Pleasant. Comfortably speaking in full sentences. Non-ill appearing. HEENT[de-identified] EOMI. PERRL. Normal conjunctiva. Nose: No nasal discharge. LUNGS: No cough during video visit. No audible dyspnea. No audible wheezing. NEURO: Alert and oriented x3  PSYCH: Normal affect. Intonation of voice is normal.  No pressured speech.           Immunization History  Administered            Date(s) Administered    >=3 YRS TRI  MULTIDOSE VIAL (52769) FLU CLINIC                          11/04/2015      Covid-19 Vaccine Pfizer 30 mcg/0.3 ml 08/25/2021  09/15/2021      FLUZONE 6 months and older PFS 0.5 ml (40709)                          12/03/2014  10/14/2022      Fluarix 6 Months And Older 0.5 ml prefilled syringe (89170)                          11/16/2018      Influenza             10/20/2011  11/21/2013  12/01/2015                            11/16/2018      Influenza Virus Vaccine - Whole                          10/20/2011  11/21/2013  12/01/2015      MMR                   05/29/2016      Pneumovax 23          05/29/2016      TDAP                  05/18/2014 03/02/2016      Tetanus               05/12/2011          DATA:    Cholesterol:     Lab Results   Component Value Date    CHOLEST 260 (H) 07/27/2023     Lab Results   Component Value Date    HDL 61 (H) 07/27/2023     Lab Results   Component Value Date    TRIG 76 07/27/2023     Lab Results   Component Value Date     (H) 07/27/2023     Lab Results   Component Value Date    AST 15 07/27/2023    AST 14 (L) 10/27/2022    AST 12 (L) 10/05/2022     Lab Results   Component Value Date    ALT 26 07/27/2023    ALT 21 10/27/2022    ALT 21 10/05/2022             ASSESSMENT AND PLAN:     Hypercholesterolemia  (primary encounter diagnosis)  Acute adjustment disorder with anxiety  Anorgasmia of female      1. Hypercholesterolemia  Patient requesting 3-month trial of therapeutic lifestyle changes. Recommend healthy diet such as Mediterranean diet, Dash diet, Ornish diet, or plant-based diet. Recommend exercise a total of 150 minutes a week. Recheck lipid panel in 3 months, then follow-up office visit. - Lipid Panel [E]; Future    2. Acute adjustment disorder with anxiety  Patient referred to [address] [place] [address] (Substance Use Disorder & Self-Pay/Medicaid)    3. Anorgasmia of female  Patient referred to [address] [place] [address] OB/GYN for further evaluation and care.   - OBG Referral - [facility] [place])        Orders Placed This Encounter      Lipid Panel [E]        Imaging & Consults:  Marthaeg 1    Return in about 3 months (around 11/30/2023) for After recheck of lipid panel. Lana Ordaz

## 2023-09-06 ENCOUNTER — HOSPITAL ENCOUNTER (EMERGENCY)
Age: 38
Discharge: HOME | End: 2023-09-06
Attending: EMERGENCY MEDICINE
Payer: MEDICAID

## 2023-09-06 VITALS
BODY MASS INDEX: 35.32 KG/M2 | HEIGHT: 65 IN | WEIGHT: 212 LBS | RESPIRATION RATE: 16 BRPM | HEART RATE: 71 BPM | DIASTOLIC BLOOD PRESSURE: 66 MMHG | OXYGEN SATURATION: 100 % | SYSTOLIC BLOOD PRESSURE: 118 MMHG | TEMPERATURE: 98 F

## 2023-09-06 DIAGNOSIS — J00 ACUTE NASOPHARYNGITIS: ICD-10-CM

## 2023-09-06 DIAGNOSIS — R09.82 PND (POST-NASAL DRIP): Primary | ICD-10-CM

## 2023-09-06 DIAGNOSIS — M62.830 BACK SPASM: ICD-10-CM

## 2023-09-06 LAB
B-HCG UR QL: NEGATIVE
POCT INFLUENZA A: NEGATIVE
POCT INFLUENZA B: NEGATIVE
SARS-COV-2 RNA RESP QL NAA+PROBE: NOT DETECTED

## 2023-09-06 PROCEDURE — 87430 STREP A AG IA: CPT | Performed by: NURSE PRACTITIONER

## 2023-09-06 PROCEDURE — 87502 INFLUENZA DNA AMP PROBE: CPT | Performed by: EMERGENCY MEDICINE

## 2023-09-06 PROCEDURE — 81025 URINE PREGNANCY TEST: CPT

## 2023-09-06 PROCEDURE — 99283 EMERGENCY DEPT VISIT LOW MDM: CPT

## 2023-09-06 PROCEDURE — 99284 EMERGENCY DEPT VISIT MOD MDM: CPT

## 2023-09-06 RX ORDER — CYCLOBENZAPRINE HCL 10 MG
10 TABLET ORAL 3 TIMES DAILY PRN
Qty: 20 TABLET | Refills: 0 | Status: SHIPPED | OUTPATIENT
Start: 2023-09-06 | End: 2023-09-13

## 2023-09-06 NOTE — DISCHARGE INSTRUCTIONS
Increase water intake 2-3 liters daily PA made aware that ICP's have  been as high as 28, no change in neuro status.

## 2023-09-11 ENCOUNTER — TELEPHONE (OUTPATIENT)
Dept: FAMILY MEDICINE CLINIC | Facility: CLINIC | Age: 38
End: 2023-09-11

## 2023-09-11 DIAGNOSIS — Z12.31 SCREENING MAMMOGRAM, ENCOUNTER FOR: Primary | ICD-10-CM

## 2023-09-16 ENCOUNTER — HOSPITAL ENCOUNTER (OUTPATIENT)
Dept: MAMMOGRAPHY | Age: 38
Discharge: HOME | End: 2023-09-16
Attending: FAMILY MEDICINE
Payer: MEDICAID

## 2023-09-16 DIAGNOSIS — Z12.31 SCREENING MAMMOGRAM, ENCOUNTER FOR: ICD-10-CM

## 2023-09-16 PROCEDURE — 77067 SCR MAMMO BI INCL CAD: CPT | Performed by: FAMILY MEDICINE

## 2023-09-16 PROCEDURE — 77063 BREAST TOMOSYNTHESIS BI: CPT | Performed by: FAMILY MEDICINE

## 2023-10-02 ENCOUNTER — OFFICE VISIT (OUTPATIENT)
Dept: ORTHOPEDICS CLINIC | Facility: CLINIC | Age: 38
End: 2023-10-02
Payer: MEDICAID

## 2023-10-02 ENCOUNTER — HOSPITAL ENCOUNTER (OUTPATIENT)
Dept: GENERAL RADIOLOGY | Age: 38
Discharge: HOME | End: 2023-10-02
Attending: PHYSICIAN ASSISTANT
Payer: MEDICAID

## 2023-10-02 VITALS — BODY MASS INDEX: 35.82 KG/M2 | HEIGHT: 65 IN | WEIGHT: 215 LBS

## 2023-10-02 DIAGNOSIS — G89.29 CHRONIC SI JOINT PAIN: Primary | ICD-10-CM

## 2023-10-02 DIAGNOSIS — R52 PAIN: ICD-10-CM

## 2023-10-02 DIAGNOSIS — M70.62 TROCHANTERIC BURSITIS OF LEFT HIP: ICD-10-CM

## 2023-10-02 DIAGNOSIS — M53.3 CHRONIC SI JOINT PAIN: Primary | ICD-10-CM

## 2023-10-02 PROCEDURE — 3008F BODY MASS INDEX DOCD: CPT | Performed by: PHYSICIAN ASSISTANT

## 2023-10-02 PROCEDURE — 73502 X-RAY EXAM HIP UNI 2-3 VIEWS: CPT | Performed by: PHYSICIAN ASSISTANT

## 2023-10-02 PROCEDURE — 72110 X-RAY EXAM L-2 SPINE 4/>VWS: CPT | Performed by: PHYSICIAN ASSISTANT

## 2023-10-02 PROCEDURE — 99203 OFFICE O/P NEW LOW 30 MIN: CPT | Performed by: PHYSICIAN ASSISTANT

## 2023-10-03 ENCOUNTER — HOSPITAL ENCOUNTER (OUTPATIENT)
Dept: ULTRASOUND IMAGING | Age: 38
Discharge: HOME | End: 2023-10-03
Attending: FAMILY MEDICINE
Payer: MEDICAID

## 2023-10-03 DIAGNOSIS — R92.2 INCONCLUSIVE MAMMOGRAM: ICD-10-CM

## 2023-10-03 PROCEDURE — 76642 ULTRASOUND BREAST LIMITED: CPT | Performed by: FAMILY MEDICINE

## 2023-10-05 ENCOUNTER — OFFICE VISIT (OUTPATIENT)
Dept: PHYSICAL THERAPY | Age: 38
End: 2023-10-05
Attending: PHYSICIAN ASSISTANT
Payer: MEDICAID

## 2023-10-05 DIAGNOSIS — G89.29 CHRONIC SI JOINT PAIN: Primary | ICD-10-CM

## 2023-10-05 DIAGNOSIS — M53.3 CHRONIC SI JOINT PAIN: Primary | ICD-10-CM

## 2023-10-05 DIAGNOSIS — M70.62 TROCHANTERIC BURSITIS OF LEFT HIP: ICD-10-CM

## 2023-10-05 PROCEDURE — 97140 MANUAL THERAPY 1/> REGIONS: CPT

## 2023-10-05 PROCEDURE — 97110 THERAPEUTIC EXERCISES: CPT

## 2023-10-05 PROCEDURE — 97161 PT EVAL LOW COMPLEX 20 MIN: CPT

## 2023-10-05 PROCEDURE — 97014 ELECTRIC STIMULATION THERAPY: CPT

## 2023-10-06 ENCOUNTER — TELEPHONE (OUTPATIENT)
Dept: PHYSICAL THERAPY | Facility: HOSPITAL | Age: 38
End: 2023-10-06

## 2023-10-09 ENCOUNTER — APPOINTMENT (OUTPATIENT)
Dept: PHYSICAL THERAPY | Age: 38
End: 2023-10-09
Attending: PHYSICIAN ASSISTANT
Payer: MEDICAID

## 2023-10-09 ENCOUNTER — TELEPHONE (OUTPATIENT)
Dept: PHYSICAL THERAPY | Age: 38
End: 2023-10-09

## 2023-10-11 ENCOUNTER — APPOINTMENT (OUTPATIENT)
Dept: PHYSICAL THERAPY | Age: 38
End: 2023-10-11
Attending: PHYSICIAN ASSISTANT
Payer: MEDICAID

## 2023-10-13 ENCOUNTER — PATIENT MESSAGE (OUTPATIENT)
Dept: FAMILY MEDICINE CLINIC | Facility: CLINIC | Age: 38
End: 2023-10-13

## 2023-10-16 ENCOUNTER — TELEPHONE (OUTPATIENT)
Dept: FAMILY MEDICINE CLINIC | Facility: CLINIC | Age: 38
End: 2023-10-16

## 2023-10-17 ENCOUNTER — APPOINTMENT (OUTPATIENT)
Dept: PHYSICAL THERAPY | Age: 38
End: 2023-10-17
Attending: PHYSICIAN ASSISTANT
Payer: MEDICAID

## 2023-10-17 NOTE — TELEPHONE ENCOUNTER
Per my chart message:    From: Deena Constantino  To: Wendy Muir  Sent: 10/13/2023  5:26 AM CDT  Subject: Urgent    Hello please see attachment. Since Elo Love suffers from tummy aches and Severe headaches. Can you please complete the medical form for her to school give her Tylenol and Pecid as needed.  Thanks

## 2023-10-17 NOTE — TELEPHONE ENCOUNTER
From: Herson Born  To: Merry Garcias  Sent: 10/13/2023  5:26 AM CDT  Subject: Urgent    Hello please see attachment. Since Andres Rendon suffers from tummy aches and Severe headaches. Can you please complete the medical form for her to school give her Tylenol and Pecid as needed.  Thanks    See TE dated 10/16/23

## 2023-10-17 NOTE — TELEPHONE ENCOUNTER
Spoke to Skyler Lee. Incorrect forms attached. Advised the correct forms would be requested of the school and request will be sent to Dr Comfort Carver. Forms with Josh Wu RN.

## 2023-10-18 ENCOUNTER — APPOINTMENT (OUTPATIENT)
Dept: PHYSICAL THERAPY | Age: 38
End: 2023-10-18
Attending: PHYSICIAN ASSISTANT
Payer: MEDICAID

## 2023-10-24 ENCOUNTER — APPOINTMENT (OUTPATIENT)
Dept: PHYSICAL THERAPY | Age: 38
End: 2023-10-24
Attending: PHYSICIAN ASSISTANT
Payer: MEDICAID

## 2023-10-26 ENCOUNTER — TELEPHONE (OUTPATIENT)
Facility: CLINIC | Age: 38
End: 2023-10-26

## 2023-10-26 ENCOUNTER — APPOINTMENT (OUTPATIENT)
Dept: PHYSICAL THERAPY | Age: 38
End: 2023-10-26
Attending: PHYSICIAN ASSISTANT
Payer: MEDICAID

## 2023-10-26 DIAGNOSIS — K76.89 LIVER NODULE: Primary | ICD-10-CM

## 2023-10-31 ENCOUNTER — APPOINTMENT (OUTPATIENT)
Dept: PHYSICAL THERAPY | Age: 38
End: 2023-10-31
Attending: PHYSICIAN ASSISTANT
Payer: MEDICAID

## 2023-10-31 ENCOUNTER — TELEPHONE (OUTPATIENT)
Dept: PHYSICAL THERAPY | Age: 38
End: 2023-10-31

## 2023-10-31 NOTE — TELEPHONE ENCOUNTER
LVM regarding no showed therapy appointment on 10/31 at 7:45am. Offered 8am and 2pm with NB on 10/31, or any other openings later this week. Let her know openings are first come, first serve and to call back to change her appointment. Gave her call center number. Trilobed Flap Text: The defect edges were debeveled with a #15 scalpel blade.  Given the location of the defect and the proximity to free margins a trilobed flap was deemed most appropriate.  Using a sterile surgical marker, an appropriate trilobed flap drawn around the defect.    The area thus outlined was incised deep to adipose tissue with a #15 scalpel blade.  The skin margins were undermined to an appropriate distance in all directions utilizing iris scissors.

## 2023-10-31 NOTE — TELEPHONE ENCOUNTER
RN called and spoke to pt, informed her that she is due for repeat US of liver. Provided central scheduling number. Pt verbalized understanding.

## 2023-11-29 ENCOUNTER — TELEPHONE (OUTPATIENT)
Dept: FAMILY MEDICINE CLINIC | Facility: CLINIC | Age: 38
End: 2023-11-29

## 2023-11-29 NOTE — TELEPHONE ENCOUNTER
Patient called requesting a referral for a OB/Gyne within [facility] in regards to obtain her first initial visit. LOV: 4/3/2023  Does patient have a HMO or PPO plan: [employer] [facility]     Patient states she went for her first Ob visit with [facility] but was only referred out get ultrasound. She would like to get a referral from her primary within [facility] that she can continue her pregnancy. Patient requesting a call back from nurse. Informed patient to allow up to 24 to 48 Business hours for a call back from a nurse.

## 2023-11-29 NOTE — TELEPHONE ENCOUNTER
Spoke to patient. She would like an OB referral through [facility]. She went to [facility] but didn't like the idea they did not have US in the office and she is a high risk pregnancy. Provided patient [employer] through Stephanie.

## 2023-12-03 ENCOUNTER — HOSPITAL ENCOUNTER (EMERGENCY)
Age: 38
Discharge: HOME | End: 2023-12-03
Attending: EMERGENCY MEDICINE
Payer: MEDICAID

## 2023-12-03 ENCOUNTER — APPOINTMENT (OUTPATIENT)
Dept: ULTRASOUND IMAGING | Age: 38
End: 2023-12-03
Attending: NURSE PRACTITIONER
Payer: MEDICAID

## 2023-12-03 VITALS
BODY MASS INDEX: 38.32 KG/M2 | HEIGHT: 65 IN | WEIGHT: 230 LBS | OXYGEN SATURATION: 100 % | DIASTOLIC BLOOD PRESSURE: 67 MMHG | TEMPERATURE: 97 F | RESPIRATION RATE: 18 BRPM | SYSTOLIC BLOOD PRESSURE: 116 MMHG | HEART RATE: 80 BPM

## 2023-12-03 DIAGNOSIS — R10.2 PELVIC PAIN IN PREGNANCY: Primary | ICD-10-CM

## 2023-12-03 DIAGNOSIS — O26.899 PELVIC PAIN IN PREGNANCY: Primary | ICD-10-CM

## 2023-12-03 DIAGNOSIS — Z87.19 HX OF CONSTIPATION: ICD-10-CM

## 2023-12-03 LAB
ANION GAP SERPL CALC-SCNC: 4 MMOL/L (ref 0–18)
B-HCG SERPL-ACNC: ABNORMAL MIU/ML
BASOPHILS # BLD AUTO: 0.03 X10(3) UL (ref 0–0.2)
BASOPHILS NFR BLD AUTO: 0.4 %
BILIRUB UR QL STRIP.AUTO: NEGATIVE
BUN BLD-MCNC: 10 MG/DL (ref 9–23)
CALCIUM BLD-MCNC: 8.7 MG/DL (ref 8.5–10.1)
CHLORIDE SERPL-SCNC: 106 MMOL/L (ref 98–112)
CLARITY UR REFRACT.AUTO: CLEAR
CO2 SERPL-SCNC: 26 MMOL/L (ref 21–32)
COLOR UR AUTO: YELLOW
CREAT BLD-MCNC: 0.78 MG/DL
EGFRCR SERPLBLD CKD-EPI 2021: 98 ML/MIN/1.73M2 (ref 60–?)
EOSINOPHIL # BLD AUTO: 0.06 X10(3) UL (ref 0–0.7)
EOSINOPHIL NFR BLD AUTO: 0.8 %
ERYTHROCYTE [DISTWIDTH] IN BLOOD BY AUTOMATED COUNT: 13.1 %
GLUCOSE BLD-MCNC: 94 MG/DL (ref 70–99)
GLUCOSE UR STRIP.AUTO-MCNC: NEGATIVE MG/DL
HCT VFR BLD AUTO: 36.7 %
HGB BLD-MCNC: 11.6 G/DL
IMM GRANULOCYTES # BLD AUTO: 0.02 X10(3) UL (ref 0–1)
IMM GRANULOCYTES NFR BLD: 0.3 %
KETONES UR STRIP.AUTO-MCNC: NEGATIVE MG/DL
LEUKOCYTE ESTERASE UR QL STRIP.AUTO: NEGATIVE
LYMPHOCYTES # BLD AUTO: 2.34 X10(3) UL (ref 1–4)
LYMPHOCYTES NFR BLD AUTO: 32.8 %
MCH RBC QN AUTO: 25.2 PG (ref 26–34)
MCHC RBC AUTO-ENTMCNC: 31.6 G/DL (ref 31–37)
MCV RBC AUTO: 79.8 FL
MONOCYTES # BLD AUTO: 0.37 X10(3) UL (ref 0.1–1)
MONOCYTES NFR BLD AUTO: 5.2 %
NEUTROPHILS # BLD AUTO: 4.32 X10 (3) UL (ref 1.5–7.7)
NEUTROPHILS # BLD AUTO: 4.32 X10(3) UL (ref 1.5–7.7)
NEUTROPHILS NFR BLD AUTO: 60.5 %
NITRITE UR QL STRIP.AUTO: NEGATIVE
OSMOLALITY SERPL CALC.SUM OF ELEC: 281 MOSM/KG (ref 275–295)
PH UR STRIP.AUTO: 7 [PH] (ref 5–8)
PLATELET # BLD AUTO: 286 10(3)UL (ref 150–450)
POTASSIUM SERPL-SCNC: 3.7 MMOL/L (ref 3.5–5.1)
PROT UR STRIP.AUTO-MCNC: NEGATIVE MG/DL
RBC # BLD AUTO: 4.6 X10(6)UL
RBC UR QL AUTO: NEGATIVE
RH BLOOD TYPE: POSITIVE
SODIUM SERPL-SCNC: 136 MMOL/L (ref 136–145)
SP GR UR STRIP.AUTO: 1.01 (ref 1–1.03)
UROBILINOGEN UR STRIP.AUTO-MCNC: 0.2 MG/DL
WBC # BLD AUTO: 7.1 X10(3) UL (ref 4–11)

## 2023-12-03 PROCEDURE — 96361 HYDRATE IV INFUSION ADD-ON: CPT

## 2023-12-03 PROCEDURE — 84702 CHORIONIC GONADOTROPIN TEST: CPT | Performed by: EMERGENCY MEDICINE

## 2023-12-03 PROCEDURE — 76801 OB US < 14 WKS SINGLE FETUS: CPT | Performed by: NURSE PRACTITIONER

## 2023-12-03 PROCEDURE — 86900 BLOOD TYPING SEROLOGIC ABO: CPT | Performed by: EMERGENCY MEDICINE

## 2023-12-03 PROCEDURE — 85025 COMPLETE CBC W/AUTO DIFF WBC: CPT | Performed by: EMERGENCY MEDICINE

## 2023-12-03 PROCEDURE — 80048 BASIC METABOLIC PNL TOTAL CA: CPT | Performed by: NURSE PRACTITIONER

## 2023-12-03 PROCEDURE — 81003 URINALYSIS AUTO W/O SCOPE: CPT | Performed by: NURSE PRACTITIONER

## 2023-12-03 PROCEDURE — 96360 HYDRATION IV INFUSION INIT: CPT

## 2023-12-03 PROCEDURE — 99284 EMERGENCY DEPT VISIT MOD MDM: CPT

## 2023-12-03 PROCEDURE — 99285 EMERGENCY DEPT VISIT HI MDM: CPT

## 2023-12-03 PROCEDURE — 86901 BLOOD TYPING SEROLOGIC RH(D): CPT | Performed by: EMERGENCY MEDICINE

## 2023-12-03 PROCEDURE — 76817 TRANSVAGINAL US OBSTETRIC: CPT | Performed by: NURSE PRACTITIONER

## 2023-12-03 NOTE — DISCHARGE INSTRUCTIONS
Contact your OB/GYNE team tomorrow to discuss workup and findings today  They may want to see you sooner than your currently scheduled appointment  They may also want to recheck your pregnancy hormone in a few days  Drink plenty of water  Pelvic rest is encouraged

## 2023-12-03 NOTE — ED PROVIDER NOTES
42-year-old female who was seen primarily by the nurse practitioner was also seen by me with complaints of intermittent lower abdominal pelvic pain. She has a history of miscarriages as well as ectopic pregnancy. She states she is currently about 7 weeks pregnant but has not seen her OB/GYN yet. She denies any pain that similar to her previous ectopics. No fevers or chills. Currently has not had any bleeding. No other acute complaints. Currently she has a benign abdomen and is completely comfortable no rebound or guarding. She did have a quantitative hCG of 15,000 and she is Rh+ and therefore does not require RhoGAM.  There is no evidence of ectopic pregnancy on her ultrasound. She had a gestational sac but no distinct fetus. Discussed with her that this is concerning for blighted ovum and that she is at risk for miscarriage patient understands we discussed that she has severe pain or anything that is consistent with her ectopic leg pain she is to return immediately. She does have an appointment with her OB/GYN coming up. I agree with the assessment and plan    I reviewed that chart and discussed the case. I have examined the patient and noted current benign abdomen pelvis     I did the substantive portion of the medical decision making. I agree with the following clinical impression(s):  Pelvic pain in pregnancy  (primary encounter diagnosis)  Hx of constipation. I agree with the plan as noted.

## 2023-12-05 ENCOUNTER — TELEPHONE (OUTPATIENT)
Facility: CLINIC | Age: 38
End: 2023-12-05

## 2023-12-05 DIAGNOSIS — O09.299 HISTORY OF MISCARRIAGE, CURRENTLY PREGNANT: Primary | ICD-10-CM

## 2023-12-05 NOTE — TELEPHONE ENCOUNTER
PER ER visit  intermittent lower abdominal pelvic pain. She has a history of miscarriages as well as ectopic pregnancy, constipation    12/3/23 US, lab hcg 15,591, B+, ER visit    7 3/7 LMP 10/14/23     Lab work pended to complete hcg progesterone once signed by provider    Appt addi with TK 8085 for ER f/u early gyne vist, pt new OB. Try to complete lab work before her appt. Per pt once she had bowel movement she felt better. Denies Pain, cramping, bleeding, or nausea. Advised continue Miralax as needed. Patient verbalized understanding, agreed to and intend to comply with plan of care.       12/15/23 FOB

## 2023-12-05 NOTE — TELEPHONE ENCOUNTER
Pt LMP 10/14/2023, FOB with TK 12/15, called letting us know she was in the ER 12/3 for abdominal pain/cramping and was told to call OB and see if any blood work, etc needs to be done.

## 2023-12-06 ENCOUNTER — LAB ENCOUNTER (OUTPATIENT)
Dept: LAB | Facility: HOSPITAL | Age: 38
End: 2023-12-06
Payer: MEDICAID

## 2023-12-06 ENCOUNTER — OFFICE VISIT (OUTPATIENT)
Facility: CLINIC | Age: 38
End: 2023-12-06
Payer: MEDICAID

## 2023-12-06 VITALS
DIASTOLIC BLOOD PRESSURE: 72 MMHG | BODY MASS INDEX: 38.52 KG/M2 | HEIGHT: 65 IN | SYSTOLIC BLOOD PRESSURE: 114 MMHG | HEART RATE: 99 BPM | WEIGHT: 231.19 LBS

## 2023-12-06 DIAGNOSIS — Z34.90 EARLY STAGE OF PREGNANCY: Primary | ICD-10-CM

## 2023-12-06 DIAGNOSIS — O09.299 HISTORY OF MISCARRIAGE, CURRENTLY PREGNANT: ICD-10-CM

## 2023-12-06 LAB
B-HCG SERPL-ACNC: ABNORMAL MIU/ML
PROGEST SERPL-MCNC: 18.9 NG/ML

## 2023-12-06 PROCEDURE — 84702 CHORIONIC GONADOTROPIN TEST: CPT

## 2023-12-06 PROCEDURE — 84144 ASSAY OF PROGESTERONE: CPT

## 2023-12-06 PROCEDURE — 99212 OFFICE O/P EST SF 10 MIN: CPT

## 2023-12-06 PROCEDURE — 3074F SYST BP LT 130 MM HG: CPT

## 2023-12-06 PROCEDURE — 3008F BODY MASS INDEX DOCD: CPT

## 2023-12-06 PROCEDURE — 3078F DIAST BP <80 MM HG: CPT

## 2023-12-06 PROCEDURE — 36415 COLL VENOUS BLD VENIPUNCTURE: CPT

## 2023-12-06 RX ORDER — CHOLECALCIFEROL (VITAMIN D3) 25 MCG
1 TABLET,CHEWABLE ORAL DAILY
COMMUNITY

## 2023-12-06 NOTE — PROGRESS NOTES
Mariana Babin is a 34year old female [id number] Patient's last menstrual period was 10/14/2023 (approximate). Chief Complaint   Patient presents with    Gyn Problem     LMP- 10/14/23     . She was seen in the ED 12/3/2023 for pelvic pain, denies vaginal bleeding. She has a history of an ectopic and D&C. The ED US report states:     GESTATIONAL SAC:  Present and normal appearing. FETAL POLE:  Not identified at this stage of gestation. YOLK SAC:  Present. CARDIAC ACTIVITY:  Not identified at this time. UTERUS:  9.2 x 5.5 x 6.5 cm. Otherwise unremarkable. RIGHT OVARY:  2.8 x 1.5 x 1.0 cm. Otherwise unremarkable. LEFT OVARY:  3.9 x 2.0 x 3.3 cm with a probable left corpus luteum measuring up to 2.5 x 1.8 x 2.1 cm. CUL-DE-SAC:  Trace free pelvic fluid is present. CLINICAL AGE:  7 weeks, 1 day   SONOGRAPHIC AGE:  6 weeks, 0 days +/-4 days   OTHER:  No subchorionic hemorrhage is seen.   The cervix was not well seen on this exam         OBSTETRICS HISTORY:  OB History    Para Term  AB Living   5 1 1   3     SAB IAB Ectopic Multiple Live Births   2   1          # Outcome Date GA Lbr Alvarez/2nd Weight Sex Delivery Anes PTL Lv   5 Current            4 SAB 10/2022           3 Term 16     Vag-Spont      2 Ectopic 2014               GYNE HISTORY:  Periods regular monthly    History   Sexual Activity    Sexual activity: Not on file                 MEDICAL HISTORY:  Past Medical History:   Diagnosis Date    Ectopic pregnancy 2014    History of loop electrical excision procedure (LEEP) 2023    2014    History of multiple miscarriages     3       SURGICAL HISTORY:  Past Surgical History:   Procedure Laterality Date    D & c      Miscarried    Leep         SOCIAL HISTORY:  Social History     Socioeconomic History    Marital status: Legally      Spouse name: Not on file    Number of children: Not on file    Years of education: Not on file    Highest education level: Not on file   Occupational History    Not on file   Tobacco Use    Smoking status: Never    Smokeless tobacco: Never   Vaping Use    Vaping Use: Never used   Substance and Sexual Activity    Alcohol use: Not Currently     Comment: Stopped Social Drinking 10/6/2022    Drug use: Never    Sexual activity: Not on file   Other Topics Concern     Service No    Blood Transfusions No    Caffeine Concern No    Occupational Exposure No    Hobby Hazards No    Sleep Concern No    Stress Concern No    Weight Concern No    Special Diet No    Back Care No    Exercise No    Bike Helmet No    Seat Belt No    Self-Exams No   Social History Narrative    Not on file     Social Determinants of Health     Financial Resource Strain: Not on file   Food Insecurity: Not on file   Transportation Needs: Not on file   Stress: Not on file   Housing Stability: Not on file       FAMILY HISTORY:  Family History   Problem Relation Age of Onset    Cancer Mother         High Blood Pressure    Breast Cancer Maternal Grandmother 72    Cancer Maternal Grandmother         Bladder Cancer and Breast Cancer    Cancer Maternal Grandfather 39         age 39 Stomach Cancer       MEDICATIONS:    Current Outpatient Medications:     prenatal vitamin with DHA 27-0.8-228 MG Oral Cap, Take 1 capsule by mouth daily. , Disp: , Rfl:     polyethylene glycol, PEG 3350, 17 g Oral Powd Pack, Take 17 g by mouth daily. , Disp: , Rfl:     Multiple Vitamin (MULTI-VITAMIN DAILY) Oral Tab, Take by mouth.  (Patient not taking: Reported on 2023), Disp: , Rfl:     ALLERGIES:  No Known Allergies      PHYSICAL EXAM:   Pelvic Exam:  External Genitalia: normal appearance, hair distribution, and no lesions  Urethral Meatus:  normal in size, location, without lesions and prolapse  Bladder:  No fullness, masses or tenderness  Vagina:  Normal appearance without lesions, no abnormal discharge  Cervix:  Normal without tenderness on motion  Uterus: normal in size, contour, position, mobility, without tenderness  Adnexa: normal without masses or tenderness  Perineum: normal  Anus: no hemorroids     Assessment & Plan:  1. Early stage of pregnancy  -12/3 - HCG 15,591  -HCG and progesterone ordered today - results pending  -may consider a repeat OB US in two weeks.

## 2023-12-07 DIAGNOSIS — O09.299 HISTORY OF MISCARRIAGE, CURRENTLY PREGNANT: Primary | ICD-10-CM

## 2023-12-07 NOTE — PROGRESS NOTES
Pt aware of results & recommendations to repeat HCG. Pt will go on Saturday. Verbalized understanding.

## 2023-12-09 ENCOUNTER — LAB ENCOUNTER (OUTPATIENT)
Dept: LAB | Age: 38
End: 2023-12-09
Payer: MEDICAID

## 2023-12-09 DIAGNOSIS — O09.299 HISTORY OF MISCARRIAGE, CURRENTLY PREGNANT: ICD-10-CM

## 2023-12-09 LAB — B-HCG SERPL-ACNC: ABNORMAL MIU/ML

## 2023-12-09 PROCEDURE — 36415 COLL VENOUS BLD VENIPUNCTURE: CPT

## 2023-12-09 PROCEDURE — 84702 CHORIONIC GONADOTROPIN TEST: CPT

## 2023-12-11 DIAGNOSIS — O09.299 HISTORY OF MISCARRIAGE, CURRENTLY PREGNANT: Primary | ICD-10-CM

## 2023-12-11 NOTE — PROGRESS NOTES
Discussed providers message: hcg results not rising as expected and US this wk recommended, appt Friday, patient verbalized understanding.

## 2023-12-15 ENCOUNTER — ULTRASOUND ENCOUNTER (OUTPATIENT)
Facility: CLINIC | Age: 38
End: 2023-12-15
Payer: MEDICAID

## 2023-12-15 RX ORDER — MISOPROSTOL 200 UG/1
800 TABLET ORAL ONCE
Qty: 4 TABLET | Refills: 1 | Status: SHIPPED | OUTPATIENT
Start: 2023-12-15 | End: 2023-12-15

## 2023-12-15 NOTE — TELEPHONE ENCOUNTER
Spoke to patient the results of her US and HCG level. Consulted with LUKE, she has an abnormal pregnancy. Discussed with patient her options of waiting to spontaneously miscarry, cytotec or consult with OB's for D&C. She chose to try the Cytotec 800 mcg vaginally times one dose, may repeat in 24 hour is vaginally bleeding does not occur. Encouraged patient to reach out if have any questions or concerns.

## 2023-12-17 ENCOUNTER — TELEPHONE (OUTPATIENT)
Dept: OBGYN CLINIC | Facility: CLINIC | Age: 38
End: 2023-12-17

## 2023-12-18 ENCOUNTER — PATIENT MESSAGE (OUTPATIENT)
Dept: OBGYN CLINIC | Facility: CLINIC | Age: 38
End: 2023-12-18

## 2023-12-19 ENCOUNTER — TELEPHONE (OUTPATIENT)
Facility: CLINIC | Age: 38
End: 2023-12-19

## 2023-12-19 DIAGNOSIS — O36.80X0 PREGNANCY WITH INCONCLUSIVE FETAL VIABILITY, SINGLE OR UNSPECIFIED FETUS: Primary | ICD-10-CM

## 2023-12-19 DIAGNOSIS — O03.9 MISCARRIAGE: Primary | ICD-10-CM

## 2023-12-19 NOTE — TELEPHONE ENCOUNTER
I just called pt to confirm her us for tomorrow. Pt said she is still waiting for TK to respond to her [employer] message about her misc. Please advise and call pt. Appt for us was cx per pt's comments.  Thanks 60

## 2023-12-23 ENCOUNTER — LAB ENCOUNTER (OUTPATIENT)
Dept: LAB | Age: 38
End: 2023-12-23
Payer: MEDICAID

## 2023-12-23 DIAGNOSIS — O03.9 MISCARRIAGE: ICD-10-CM

## 2023-12-23 LAB — B-HCG SERPL-ACNC: 299 MIU/ML

## 2023-12-23 PROCEDURE — 84702 CHORIONIC GONADOTROPIN TEST: CPT

## 2023-12-23 PROCEDURE — 36415 COLL VENOUS BLD VENIPUNCTURE: CPT

## 2023-12-25 DIAGNOSIS — O03.9 MISCARRIAGE: Primary | ICD-10-CM

## 2023-12-30 ENCOUNTER — LAB ENCOUNTER (OUTPATIENT)
Dept: LAB | Age: 38
End: 2023-12-30
Payer: MEDICAID

## 2023-12-30 DIAGNOSIS — O03.9 MISCARRIAGE: ICD-10-CM

## 2023-12-30 LAB — B-HCG SERPL-ACNC: 23 MIU/ML

## 2023-12-30 PROCEDURE — 36415 COLL VENOUS BLD VENIPUNCTURE: CPT

## 2023-12-30 PROCEDURE — 84702 CHORIONIC GONADOTROPIN TEST: CPT

## 2023-12-31 DIAGNOSIS — O03.9 MISCARRIAGE: Primary | ICD-10-CM

## 2024-01-02 ENCOUNTER — TELEPHONE (OUTPATIENT)
Dept: ORTHOPEDICS CLINIC | Facility: CLINIC | Age: 39
End: 2024-01-02

## 2024-01-02 ENCOUNTER — TELEPHONE (OUTPATIENT)
Facility: CLINIC | Age: 39
End: 2024-01-02

## 2024-01-02 DIAGNOSIS — M79.672 LEFT FOOT PAIN: Primary | ICD-10-CM

## 2024-01-02 DIAGNOSIS — M79.671 RIGHT FOOT PAIN: ICD-10-CM

## 2024-01-02 NOTE — TELEPHONE ENCOUNTER
Pt calling with questions regarding whether an US is needed/HCG levels, requesting to speak with a nurse.   General Sunscreen Counseling: I recommended a broad spectrum sunscreen with a SPF of 30 or higher.  I explained that SPF 30 sunscreens block approximately 97 percent of the sun's harmful rays.  Sunscreens should be applied at least 15 minutes prior to expected sun exposure and then every 2 hours after that as long as sun exposure continues. If swimming or exercising sunscreen should be reapplied every 45 minutes to an hour after getting wet or sweating.  One ounce, or the equivalent of a shot glass full of sunscreen, is adequate to protect the skin not covered by a bathing suit. I also recommended a lip balm with a sunscreen as well. Sun protective clothing can be used in lieu of sunscreen but must be worn the entire time you are exposed to the sun's rays. Detail Level: Detailed

## 2024-01-02 NOTE — TELEPHONE ENCOUNTER
Spoke with pt. Aware we will follow HCG level to zero. No further bleeding or cramping. Does not need another ultrasound at this time. Verbalized understanding.

## 2024-01-02 NOTE — TELEPHONE ENCOUNTER
Patient made appt online for Bone spurs in nathan feet. No imaging in epic. Please place xray orders and pt will arrive 20min early to complete.     Thanks!    Future Appointments   Date Time Provider Department Center   1/9/2024  8:15 AM Sherice Cohn DPM [facility]   1/14/2024  1:00 PM [facility]

## 2024-01-02 NOTE — TELEPHONE ENCOUNTER
From: Merlyn Baldwin  To: Melissa Pak  Sent: 12/18/2023 7:43 AM CST  Subject: Misoprostol Warning Graphic Photo attached    Hello, can you put in for labs to check my HCG level. I took meds Friday night at 8pm Starting bleeding about 1am Saturday. I've been cramping and bleeding clots all day Saturday and last night Sunday night I passed this. Please see attached. I'm feeling much better but I know blood testing is the next step. Thanks

## 2024-01-03 ENCOUNTER — TELEPHONE (OUTPATIENT)
Dept: OBGYN UNIT | Facility: HOSPITAL | Age: 39
End: 2024-01-03

## 2024-01-05 ENCOUNTER — LAB ENCOUNTER (OUTPATIENT)
Dept: LAB | Age: 39
End: 2024-01-05
Attending: FAMILY MEDICINE
Payer: MEDICAID

## 2024-01-05 DIAGNOSIS — O03.9 MISCARRIAGE: ICD-10-CM

## 2024-01-05 LAB — B-HCG SERPL-ACNC: 7 MIU/ML

## 2024-01-05 PROCEDURE — 84702 CHORIONIC GONADOTROPIN TEST: CPT

## 2024-01-05 PROCEDURE — 36415 COLL VENOUS BLD VENIPUNCTURE: CPT

## 2024-01-07 DIAGNOSIS — O03.9 MISCARRIAGE: Primary | ICD-10-CM

## 2024-01-13 ENCOUNTER — PATIENT MESSAGE (OUTPATIENT)
Dept: OBGYN CLINIC | Facility: CLINIC | Age: 39
End: 2024-01-13

## 2024-01-13 ENCOUNTER — LAB ENCOUNTER (OUTPATIENT)
Dept: LAB | Age: 39
End: 2024-01-13
Payer: MEDICAID

## 2024-01-13 DIAGNOSIS — O03.9 MISCARRIAGE: ICD-10-CM

## 2024-01-13 LAB — B-HCG SERPL-ACNC: 2 MIU/ML

## 2024-01-13 PROCEDURE — 36415 COLL VENOUS BLD VENIPUNCTURE: CPT

## 2024-01-13 PROCEDURE — 84702 CHORIONIC GONADOTROPIN TEST: CPT

## 2024-01-16 NOTE — TELEPHONE ENCOUNTER
From: Merlyn Baldwin  To: Melissa Pak  Sent: 1/13/2024 7:23 PM CST  Subject: HCG    Hello I know that you guys haven't had a chance to review my HCG blood test levels but I received it back and I see that it's decreasing slowly. I believe it was at 8 or 9 last week and this week its at a 2. Does it normally take this long to drop or could there be another concern?

## 2024-01-18 ENCOUNTER — HOSPITAL ENCOUNTER (OUTPATIENT)
Dept: GENERAL RADIOLOGY | Age: 39
Discharge: HOME | End: 2024-01-18
Attending: PODIATRIST
Payer: MEDICAID

## 2024-01-18 ENCOUNTER — OFFICE VISIT (OUTPATIENT)
Dept: ORTHOPEDICS CLINIC | Facility: CLINIC | Age: 39
End: 2024-01-18
Payer: MEDICAID

## 2024-01-18 ENCOUNTER — PATIENT MESSAGE (OUTPATIENT)
Dept: ORTHOPEDICS CLINIC | Facility: CLINIC | Age: 39
End: 2024-01-18

## 2024-01-18 VITALS — BODY MASS INDEX: 38.52 KG/M2 | HEIGHT: 65 IN | WEIGHT: 231.19 LBS

## 2024-01-18 DIAGNOSIS — E66.3 PATIENT OVERWEIGHT: ICD-10-CM

## 2024-01-18 DIAGNOSIS — M24.9 HYPERMOBILITY OF JOINT: ICD-10-CM

## 2024-01-18 DIAGNOSIS — M21.41 PES PLANUS OF BOTH FEET: ICD-10-CM

## 2024-01-18 DIAGNOSIS — M79.672 LEFT FOOT PAIN: Primary | ICD-10-CM

## 2024-01-18 DIAGNOSIS — M79.672 LEFT FOOT PAIN: ICD-10-CM

## 2024-01-18 DIAGNOSIS — M21.42 PES PLANUS OF BOTH FEET: ICD-10-CM

## 2024-01-18 DIAGNOSIS — M79.671 RIGHT FOOT PAIN: ICD-10-CM

## 2024-01-18 PROCEDURE — 73630 X-RAY EXAM OF FOOT: CPT | Performed by: PODIATRIST

## 2024-01-18 PROCEDURE — 3008F BODY MASS INDEX DOCD: CPT | Performed by: PODIATRIST

## 2024-01-18 PROCEDURE — 99204 OFFICE O/P NEW MOD 45 MIN: CPT | Performed by: PODIATRIST

## 2024-01-18 NOTE — PROGRESS NOTES
[facility] New Patient Note    CC:   Chief Complaint   Patient presents with    Foot Pain     Bilateral foot pain;   Numbness when standing;   Knots on top of both feet;   Doc told her possible bone spurs;  Feet collapse when WB;   Inserts for top of feet no longer helping not rub on shoe;  Pain Score: 6-7, @peak 10       HPI: The patient is a 38 year old female who presents today with complaints of painful bumps on the top of her feet for years now   she will get some numbness on the top of the feet as well.  She has had flatfeet for years and has tried some over-the-counter inserts but they tend to rub more on the top of the foot.  He can be quite painful at times and she is on her feet a lot.  A lot of walking.    Will use topical lidocaine  Uses lambs wool        Past Medical History:   Diagnosis Date    Ectopic pregnancy     History of loop electrical excision procedure (LEEP) 2023    History of multiple miscarriages     3     Past Surgical History:   Procedure Laterality Date    D & C      Miscarried    LEEP       Current Outpatient Medications   Medication Sig Dispense Refill    Ferrous Sulfate (IRON OR) Take 1 tablet by mouth daily.      Turmeric (QC TUMERIC COMPLEX OR) Take by mouth.      polyethylene glycol, PEG 3350, 17 g Oral Powd Pack Take 17 g by mouth daily.      Multiple Vitamin (MULTI-VITAMIN DAILY) Oral Tab Take by mouth. (Patient not taking: Reported on 2023)       No Known Allergies  Family History   Problem Relation Age of Onset    Cancer Mother         High Blood Pressure    Breast Cancer Maternal Grandmother 65    Cancer Maternal Grandmother         Bladder Cancer and Breast Cancer    Cancer Maternal Grandfather 45         age 45 Stomach Cancer     Social History     Occupational History    Not on file   Tobacco Use    Smoking status: Never    Smokeless tobacco: Never   Vaping Use    Vaping Use: Never used   Substance and Sexual Activity     Alcohol use: Not Currently     Comment: Stopped Social Drinking 10/6/2022    Drug use: Never    Sexual activity: Not on file        ROS:  Complete ROS reviewed by me and non-contributory to the chief complaint except as mentioned above.    Physical Exam:    Ht 5' 5\" (1.651 m)   Wt 231 lb 3.2 oz (104.9 kg)   LMP 10/14/2023 (Approximate)   BMI 38.47 kg/m²       Upon stance she has a wide flexible flatfoot and worsening of the flatfoot upon stance with significant overpronation in walking gait.  Tenderness over the dorsum of both feet with mild swelling and erythema.  This is in the area of the first metatarsal tarsal joint and to a lesser extent second metatarsal tarsal joint.  No pain with motion of the midfoot joints.  No pain distally or proximally but pretty exquisite pain with pressure over the palpable bump.  Sensation is intact sharp versus dull.  She can feel light touch to the tips of the toes  Palpable pedal pulses.  Hair growth is present.  Skin is supple and feet are well perfused and warm.    Strength is 5 out of 5 all muscle groups    Full range of motion and nonpainful motion of the ankle joint, subtalar joint, MP joints, and midfoot joints.     Imaging: X-rays do not show midfoot arthritis.  Normal appearance to the midfoot joints and on the lateral view there is no exostosis.  Mild increased soft tissue density noted bilateral lateral views, weightbearing.  Changes otherwise consistent with flexible flatfoot.  Personally viewed, independently interpreted and radiology report read.      Assessment/Diagnoses:  Diagnoses and all orders for this visit:    Left foot pain    Right foot pain    Hypermobility of joint    Pes planus of both feet        Plan:  I reviewed imaging and exam findings with the patient.  We looked at the x-rays together and discussed the findings, that of normal x-rays other than pretty significant flatfeet.  This is the origin of her problem and the pain on the dorsum of the  foot.  She has not developed arthritis or an exostosis as of yet but I could see that starting in the next few years if she does not support the feet.  She has tried OTC inserts and I think it is time for custom orthotics.  Unfortunately she probably will have to pay cash for these as Medicaid routinely does not pay for them.      We discussed what custom orthotics are in the benefit.  We discussed the process of obtaining them and how they are molded.  We discussed how she needs to gradually get used to them over 2 to 3 weeks and back off if she is having any issues such as discomfort blisters calluses swelling and pain.      Topical voltaren gel otc.  She also has lidocaine patches.  She could try 5%- may reach out on my chart-we would order from a compounding company.    [place]oka type shoe was discussed and how they would benefit her.    Custom orthotics- [employer]    Long-term may need to consider flatfoot surgery if this persists.  Briefly discussed what is involved with fusion of the midfoot area.      Note to HR for work.  She would benefit from wearing her good tennis shoes and orthotics during work.      Sherice Cohn, RAMONE  [facility]      This document was partially prepared using Dragon Medical voice recognition software.

## 2024-01-21 DIAGNOSIS — O03.9 MISCARRIAGE: Primary | ICD-10-CM

## 2024-02-08 ENCOUNTER — TELEPHONE (OUTPATIENT)
Facility: CLINIC | Age: 39
End: 2024-02-08

## 2024-02-08 NOTE — TELEPHONE ENCOUNTER
1st reminder letter sent out via mail and [employer] for the following:   US LIVER (CPT=76705) (Order #[id number]) on 10/30/23

## 2024-11-28 ENCOUNTER — APPOINTMENT (OUTPATIENT)
Dept: GENERAL RADIOLOGY | Age: 39
End: 2024-11-28
Attending: EMERGENCY MEDICINE
Payer: MEDICAID

## 2024-11-28 ENCOUNTER — HOSPITAL ENCOUNTER (EMERGENCY)
Age: 39
Discharge: HOME | End: 2024-11-28
Payer: MEDICAID

## 2024-11-28 VITALS
HEART RATE: 96 BPM | TEMPERATURE: 98 F | RESPIRATION RATE: 16 BRPM | DIASTOLIC BLOOD PRESSURE: 93 MMHG | OXYGEN SATURATION: 100 % | SYSTOLIC BLOOD PRESSURE: 140 MMHG

## 2024-11-28 DIAGNOSIS — J06.9 VIRAL UPPER RESPIRATORY ILLNESS: Primary | ICD-10-CM

## 2024-11-28 LAB
POCT INFLUENZA A: NEGATIVE
POCT INFLUENZA B: NEGATIVE
SARS-COV-2 RNA RESP QL NAA+PROBE: NOT DETECTED

## 2024-11-28 PROCEDURE — 99284 EMERGENCY DEPT VISIT MOD MDM: CPT

## 2024-11-28 PROCEDURE — 71045 X-RAY EXAM CHEST 1 VIEW: CPT | Performed by: EMERGENCY MEDICINE

## 2024-11-28 PROCEDURE — 87502 INFLUENZA DNA AMP PROBE: CPT | Performed by: EMERGENCY MEDICINE

## 2024-11-28 PROCEDURE — 87430 STREP A AG IA: CPT | Performed by: EMERGENCY MEDICINE

## 2024-11-28 RX ORDER — METHYLPREDNISOLONE 4 MG/1
TABLET ORAL
Qty: 1 EACH | Refills: 0 | Status: SHIPPED | OUTPATIENT
Start: 2024-11-28

## 2024-11-29 NOTE — ED PROVIDER NOTES
Patient Seen in: [facility] [place]      History     Chief Complaint   Patient presents with    Cough/URI     Stated Complaint: flu symptoms for the past two days    Subjective:   HPI      39-year-old female.  In the last 48 hours patient has developed new URI type symptoms with congested cough and subtle fever.  She has a  at home and wants to ensure she does not have influenza or COVID.  Using her albuterol inhaler as needed.    Objective:     Past Medical History:    Ectopic pregnancy (HCC)    History of loop electrical excision procedure (LEEP)    2014    History of multiple miscarriages    3              Past Surgical History:   Procedure Laterality Date    D & c      Miscarried    Leep                  Social History     Socioeconomic History    Marital status: Legally    Tobacco Use    Smoking status: Never    Smokeless tobacco: Never   Vaping Use    Vaping status: Never Used   Substance and Sexual Activity    Alcohol use: Not Currently     Comment: Stopped Social Drinking 10/6/2022    Drug use: Never   Other Topics Concern     Service No    Blood Transfusions No    Caffeine Concern No    Occupational Exposure No    Hobby Hazards No    Sleep Concern No    Stress Concern No    Weight Concern No    Special Diet No    Back Care No    Exercise No    Bike Helmet No    Seat Belt No    Self-Exams No     Social Drivers of Health     Financial Resource Strain: Not on File (2023)    Received from [facility]    Financial Resource Strain     Financial Resource Strain: 0   Food Insecurity: Food Insecurity Present (10/9/2024)    Received from  [facility]    Hunger Vital Sign     Worried About Running Out of Food in the Last Year: Often true     Ran Out of Food in the Last Year: Sometimes true   Transportation Needs: No Transportation Needs (10/9/2024)    Received from  [facility]    PRAPARE - Transportation     Lack of Transportation (Medical): No     Lack of  Outpatient Speech Language Pathology   Peds Swallow Treatment Note  [facility]     Patient Name: Bola Manriquez  : 2019  MRN: [id number]  Today's Date: 2018         Visit Date: 2018      Patient Active Problem List   Diagnosis   • arnoldo jelly cyst   • Breech presentation   • Gastroesophageal reflux disease without esophagitis   • Umbilical hernia without obstruction and without gangrene       Visit Dx:    ICD-10-CM ICD-9-CM   1. Feeding difficulties R63.3 783.3                             OP SLP Assessment/Plan - 18 0800        SLP Assessment    Functional Problems Swallowing  -LA    Impact on Function: Swallowing Risk of malnourishment;Impact on social aspects of eating;Risk of aspiration  -LA    Clinical Impression: Swallowing Moderate-Severe:;dysphagia unspecified  -LA    Functional Problems Comment No functional mastication skills, food refusals, oral aversion, suspected tongue tie  -LA    Clinical Impression Comments Oral ROM excercises using z-vibe and chewy tube. Pt took four small bites of a veggie straw and demonstrated improved mastication today.  Pt able to interact with a taste (from fingers) of applesauce. Parent reports pt is being referred to a specialist regarding possible tongue tie.   -LA    Prognosis Good (comment)  -LA    Patient/caregiver participated in establishment of treatment plan and goals Yes  -LA    Patient would benefit from skilled therapy intervention Yes  -LA       SLP Plan    Frequency 1x week  -LA    Duration 6 months  -LA    Planned CPT's? SLP SWALLOW THERAPY: 43280  -LA    Expected Duration Therapy Session - minutes 45-60 minutes  -LA    Plan Comments Pt benefits from weekly feeding therapy sessions to address aforementioned deficits  -LA      User Key  (r) = Recorded By, (t) = Taken By, (c) = Cosigned By    Initials Name Provider Type    Yi Reed, MS CCC-SLP Speech and Language Pathologist                SLP OP Goals     Row Name  08/07/18 0800          Goal Type Needed    Goal Type Needed Dysphagia  -LA        Subjective Comments    Subjective Comments Pt accompanied by his mother and grandmother to today's appointment.  Mom reports pts thrush has improved.  Mom also reports pt has started seeing a chiropractor and a myofunctional therapist.   -LA        Dysphagia Goals    Dysphagia LTG's Patient will safely consume the recommended diet without complications such as aspiration pneumonia  -LA     Status: Patient will safely consume the recommended diet without complications such as aspiration pneumonia New  -LA        Short-Term Goals    STG- 1 Pt will add at least two new foods into diet repertoire in 6 sessions per parent report  -LA     Status: STG- 1 New  -LA     Comments: STG- 1 Parent reports Pt tried meatloaf and enjoyed it  -LA     STG- 2 Pt will touch, lick, bite, chew nonpreferred foods with assist from SLP  -LA     Status: STG- 2 New  -LA     Comments: STG- 2 Pt accepted four small bites of a veggie straw and yogurt (preferred) chewed and swallowed.  -LA     STG- 3 Pt will complete oral range of motion exercises targeting chewing with SLP assist.  -LA     Status: STG- 3 New  -LA     Comments: STG- 3 Chewy tube and z vibe used today, pt tolerated well.   -LA        SLP Time Calculation    SLP Goal Re-Cert Due Date 08/21/18  -LA       User Key  (r) = Recorded By, (t) = Taken By, (c) = Cosigned By    Initials Name Provider Type    Yi Reed MS CCC-SLP Speech and Language Pathologist                OP SLP Education     Row Name 08/07/18 0800       Education    Barriers to Learning No barriers identified  -LA    Education Provided Patient requires further education on strategies, risks;Family/caregivers demonstrated recommended strategies  -LA    Assessed Learning readiness;Learning preferences;Learning motivation;Learning needs  -LA    Learning Motivation Strong  -LA    Learning Method Explanation;Demonstration  -LA     Transportation (Non-Medical): No   Stress: Not on File (11/16/2023)    Received from [facility]    Stress     Stress: 0   Housing Stability: High Risk (10/9/2024)    Received from [facility]    Housing Stability Vital Sign     Unable to Pay for Housing in the Last Year: Yes     Number of Times Moved in the Last Year: 1     Homeless in the Last Year: No                  Physical Exam     ED Triage Vitals [11/28/24 2220]   BP (!) 140/93   Pulse 96   Resp 16   Temp 98.4 °F (36.9 °C)   Temp src    SpO2 100 %   O2 Device None (Room air)       Current Vitals:   Vital Signs  BP: (!) 140/93  Pulse: 96  Resp: 16  Temp: 98.4 °F (36.9 °C)    Oxygen Therapy  SpO2: 100 %  O2 Device: None (Room air)        Physical Exam     Gen: Well appearing, well groomed, alert and aware x 3  Neck: Supple, full range of motion, no thyromegaly or lymphadenopathy.  Eye examination: EOMs are intact, normal conjunctival  ENT: Low-grade audible laryngitis.  Lung: No distress, RR, no retraction, breath sounds are clear bilaterally  Cardio: Regular rate and rhythm, normal S1-S2, no murmur appreciable  Skin: No sign of trauma, Skin warm and dry, no induration or sign of infection.  No rash noted    ED Course     Labs Reviewed   RAPID SARS-COV-2 BY PCR - Normal   POCT FLU TEST - Normal    Narrative:     This assay is a rapid molecular in vitro test utilizing nucleic acid amplification of influenza A and B viral RNA.   RAPID STREP A SCREEN (LC) - Normal    Narrative:     A confirmatory culture is recommended if clinically indicated.        XR CHEST AP PORTABLE  (CPT=71045)    Result Date: 11/28/2024  PROCEDURE:  XR CHEST AP PORTABLE  (CPT=71045)  TECHNIQUE:  AP chest radiograph was obtained.  COMPARISON:  None.  INDICATIONS:  flu symptoms for the past two days  PATIENT STATED HISTORY: (As transcribed by Technologist)  Patient has nausea, fever, chest pain in mid sternum region, raspy voice, congestion, and a cough with phelgm for 2 days.    FINDINGS:   Teaching Response Verbalized understanding  -LA    Education Comments Parents encouraged to continue offering table foods and complete daily oral range of motion activities.  -LA      User Key  (r) = Recorded By, (t) = Taken By, (c) = Cosigned By    Initials Name Effective Dates    Yi Reed, MS CCC-SLP 11/13/17 -                    Time Calculation:   SLP Start Time: 0800  SLP Stop Time: 0845  SLP Time Calculation (min): 45 min    Therapy Charges for Today     Code Description Service Date Service Provider Modifiers Qty    [id number]  ST TREATMENT SPEECH 3 8/7/2018 Yi Richardson, MS CCC-SLP GN 1                     Yi Richardson MS CCC-SLP  8/7/2018   The heart is normal in size.  The lungs are clear.  There are no pleural effusions.  The bones are unremarkable.            CONCLUSION:  No acute disease.    LOCATION:  [facility]      Dictated by (CST): Juan Piper MD on 11/28/2024 at 10:31 PM     Finalized by (CST): Juan Piper MD on 11/28/2024 at 10:33 PM                 MDM      Low-grade audible laryngitis  Nontoxic-appearing female.  Afebrile.  100% room air.  No tachycardia.  Breath sounds are clear.  Less than 48 hours of symptoms.  Influenza and COVID swab obtained.  Chest x-ray    Influenza negative  Strep negative  COVID-negative    CONCLUSION:  No acute disease.    LOCATION:  [facility]      Dictated by (CST): Juan Piper MD on 11/28/2024 at 10:31 PM     Finalized by (CST): Juan Piper MD on 11/28/2024 at 10:33 PM        Push clear fluids.  Follow steroid taper as written.  Continue albuterol as needed.  For fever or worsening please seek reevaluation    Medical Decision Making      Disposition and Plan     Clinical Impression:  1. Viral upper respiratory illness         Disposition:  There is no disposition on file for this visit.  There is no disposition time on file for this visit.    Follow-up:  Radha Ames [place]  [address] [address] [address] [phone]    Follow up            Medications Prescribed:  Current Discharge Medication List        START taking these medications    Details   methylPREDNISolone (MEDROL) 4 MG Oral Tablet Therapy Pack Dosepack: take as directed  Qty: 1 each, Refills: 0                 Supplementary Documentation:

## 2024-11-29 NOTE — DISCHARGE INSTRUCTIONS
Push clear fluids.  Follow steroid taper as written.  Continue albuterol as needed.  For fever or worsening please seek reevaluation

## 2024-12-13 ENCOUNTER — HOSPITAL ENCOUNTER (EMERGENCY)
Age: 39
Discharge: HOME | End: 2024-12-13
Payer: MEDICAID

## 2024-12-13 ENCOUNTER — APPOINTMENT (OUTPATIENT)
Dept: ULTRASOUND IMAGING | Age: 39
End: 2024-12-13
Attending: PHYSICIAN ASSISTANT
Payer: MEDICAID

## 2024-12-13 VITALS
BODY MASS INDEX: 36.65 KG/M2 | HEIGHT: 65 IN | HEART RATE: 81 BPM | WEIGHT: 220 LBS | DIASTOLIC BLOOD PRESSURE: 71 MMHG | RESPIRATION RATE: 17 BRPM | TEMPERATURE: 98 F | OXYGEN SATURATION: 99 % | SYSTOLIC BLOOD PRESSURE: 128 MMHG

## 2024-12-13 DIAGNOSIS — M25.561 ACUTE PAIN OF RIGHT KNEE: Primary | ICD-10-CM

## 2024-12-13 PROCEDURE — 99284 EMERGENCY DEPT VISIT MOD MDM: CPT

## 2024-12-13 PROCEDURE — 99283 EMERGENCY DEPT VISIT LOW MDM: CPT

## 2024-12-13 PROCEDURE — 93971 EXTREMITY STUDY: CPT | Performed by: PHYSICIAN ASSISTANT

## 2024-12-14 NOTE — DISCHARGE INSTRUCTIONS
Ace wrap during the day.  Remove at night.  Elevate and ice.  Please follow-up with the provided orthopedic specialist

## 2024-12-14 NOTE — ED PROVIDER NOTES
Patient Seen in: [facility]      History     Chief Complaint   Patient presents with    Swelling Edema     Stated Complaint: 8 weeks postpartum to ER for swelling and pain to her right knee. No recent fal*    Subjective:   HPI    39-year-old female.  Patient arrives to the ER for evaluation of right knee pain and swelling.  Patient explains that she is 8 weeks postpartum.  She had knee issues throughout her late pregnancy.  She had bilateral lower extremity edema and had episodes of right knee buckling and pain.  These last few days this seems to be worsened without clear explanation.  No chest pain or shortness of breath.    Objective:     Past Medical History:    Ectopic pregnancy (HCC)    History of loop electrical excision procedure (LEEP)    2014    History of multiple miscarriages    3              Past Surgical History:   Procedure Laterality Date    D & c  2003    Miscarried    Leep                  Social History     Socioeconomic History    Marital status: Legally    Tobacco Use    Smoking status: Never    Smokeless tobacco: Never   Vaping Use    Vaping status: Never Used   Substance and Sexual Activity    Alcohol use: Not Currently     Comment: Stopped Social Drinking 10/6/2022    Drug use: Never   Other Topics Concern     Service No    Blood Transfusions No    Caffeine Concern No    Occupational Exposure No    Hobby Hazards No    Sleep Concern No    Stress Concern No    Weight Concern No    Special Diet No    Back Care No    Exercise No    Bike Helmet No    Seat Belt No    Self-Exams No     Social Drivers of Health     Financial Resource Strain: Not on File (11/16/2023)    [facility]    Financial Resource Strain     Financial Resource Strain: 0   Food Insecurity: Food Insecurity Present (10/9/2024)    Received from [facility]    Hunger Vital Sign     Worried About Running Out of Food in the Last Year: Often true     Ran Out of Food in the  Last Year: Sometimes true   Transportation Needs: No Transportation Needs (10/9/2024)    Received from [facility]    PRAPARE - Transportation     Lack of Transportation (Medical): No     Lack of Transportation (Non-Medical): No   Stress: Not on File (11/16/2023)    [facility] from [facility]    Stress     Stress: 0   Housing Stability: High Risk (10/9/2024)    Received from [facility]    Housing Stability Vital Sign     Unable to Pay for Housing in the Last Year: Yes     Number of Times Moved in the Last Year: 1     Homeless in the Last Year: No                  Physical Exam     ED Triage Vitals [12/13/24 1929]   /75   Pulse 87   Resp 18   Temp 98.3 °F (36.8 °C)   Temp src Oral   SpO2 99 %   O2 Device None (Room air)       Current Vitals:   Vital Signs  BP: 126/75  Pulse: 87  Resp: 18  Temp: 98.3 °F (36.8 °C)  Temp src: Oral    Oxygen Therapy  SpO2: 99 %  O2 Device: None (Room air)        Physical Exam     Gen: Well appearing, well groomed, alert and aware x 3  Neck: Supple, full range of motion  Eye examination: EOMs are intact, normal conjunctival  ENT: Atraumatic  Lung: No distress, RR, no retraction  Extremities: Right knee is swollen.  No erythema, warmth or induration.  Discomfort with flexion but maintains full range of motion.  Strong dorsal pedis pulse.  Equal calf circumference bilaterally  Back: Full range of motion  Skin: No sign of trauma, Skin warm and dry, no induration or sign of infection.   Neuro: Cranial nerves intact (taste and smell omited), Normal Gait.Extremity strength is 5/5 and equal bilaterally. Sensation is equal bilaterally.    ED Course   Labs Reviewed - No data to display     US VENOUS DOPPLER LEG RIGHT - DIAG IMG (CPT=93971)    Result Date: 12/13/2024  PROCEDURE:  US VENOUS DOPPLER LEG RIGHT - DIAG IMG (CPT=93971)  COMPARISON:  None.  INDICATIONS:  Right knee pain and swelling.  TECHNIQUE:  Real time, grey scale, and duplex ultrasound was used to evaluate the lower extremity  venous system. B-mode two-dimensional images of the vascular structures, Doppler spectral analysis, and color flow.  Doppler imaging were performed.  The following veins were imaged:  Common, deep, and superficial femoral, popliteal, sapheno-femoral junction, posterior tibial veins, and the contralateral common femoral vein.  PATIENT STATED HISTORY: (As transcribed by Technologist)  Patient stated left knee pain and swelling for about ten days, 8 weeks post-partum.    FINDINGS:  EXTREMITY EXAMINED:  Right lower extremity SAPHENOFEMORAL JUNCTION:  No reflux. THROMBI:  None visible. COMPRESSION:  Normal compressibility, phasicity, and augmentation. OTHER:  Negative.            CONCLUSION:  No DVT right lower extremity.   LOCATION:  [facility]    Dictated by (CST): Juan Piper MD on 12/13/2024 at 9:35 PM     Finalized by (CST): Juan Piper MD on 12/13/2024 at 9:37 PM       XR CHEST AP PORTABLE  (CPT=71045)    Result Date: 11/28/2024  PROCEDURE:  XR CHEST AP PORTABLE  (CPT=71045)  TECHNIQUE:  AP chest radiograph was obtained.  COMPARISON:  None.  INDICATIONS:  flu symptoms for the past two days  PATIENT STATED HISTORY: (As transcribed by Technologist)  Patient has nausea, fever, chest pain in mid sternum region, raspy voice, congestion, and a cough with phelgm for 2 days.    FINDINGS:  The heart is normal in size.  The lungs are clear.  There are no pleural effusions.  The bones are unremarkable.            CONCLUSION:  No acute disease.    LOCATION:  [facility]      Dictated by (CST): Juan Piper MD on 11/28/2024 at 10:31 PM     Finalized by (CST): Juan Piper MD on 11/28/2024 at 10:33 PM             MDM      Extremities: Right knee is swollen.  No erythema, warmth or induration.  Discomfort with flexion but maintains full range of motion.  Strong dorsal pedis pulse.  Equal calf circumference bilaterally      CONCLUSION:  No DVT right lower extremity.   LOCATION:  [facility]    Dictated by (CST): Juan Piper MD on  12/13/2024 at 9:35 PM     Finalized by (CST): Juan Piper MD on 12/13/2024 at 9:37 PM       Ace wrap during the day.  Remove at night.  Elevate and ice.  Please follow-up with the provided orthopedic specialist    Medical Decision Making      Disposition and Plan     Clinical Impression:  1. Acute pain of right knee         Disposition:  Discharge  12/13/2024  9:54 pm    Follow-up:  Marsha Pineda, PA  [address] [place] [id number] [phone]    Follow up            Medications Prescribed:  Current Discharge Medication List              Supplementary Documentation:

## 2024-12-17 ENCOUNTER — TELEPHONE (OUTPATIENT)
Dept: ORTHOPEDICS CLINIC | Facility: CLINIC | Age: 39
End: 2024-12-17

## 2024-12-17 DIAGNOSIS — Z01.89 ENCOUNTER FOR LOWER EXTREMITY COMPARISON IMAGING STUDY: ICD-10-CM

## 2024-12-17 DIAGNOSIS — M25.561 RIGHT KNEE PAIN, UNSPECIFIED CHRONICITY: Primary | ICD-10-CM

## 2024-12-17 NOTE — TELEPHONE ENCOUNTER
XR ordered per ortho protocol. XR scheduled and patient was notified via [facility] to let them know that they should arrive 15-20 minutes early, in order for them to complete imaging.

## 2024-12-18 ENCOUNTER — OFFICE VISIT (OUTPATIENT)
Dept: ORTHOPEDICS CLINIC | Facility: CLINIC | Age: 39
End: 2024-12-18
Payer: MEDICAID

## 2024-12-18 ENCOUNTER — HOSPITAL ENCOUNTER (OUTPATIENT)
Dept: GENERAL RADIOLOGY | Age: 39
Discharge: HOME | End: 2024-12-18
Attending: PHYSICIAN ASSISTANT
Payer: MEDICAID

## 2024-12-18 VITALS — HEIGHT: 65 IN | BODY MASS INDEX: 36.65 KG/M2 | WEIGHT: 220 LBS

## 2024-12-18 DIAGNOSIS — M22.41 CHONDROMALACIA PATELLAE OF RIGHT KNEE: ICD-10-CM

## 2024-12-18 DIAGNOSIS — M25.461 EFFUSION, RIGHT KNEE: Primary | ICD-10-CM

## 2024-12-18 DIAGNOSIS — M25.561 RIGHT KNEE PAIN, UNSPECIFIED CHRONICITY: ICD-10-CM

## 2024-12-18 DIAGNOSIS — Z01.89 ENCOUNTER FOR LOWER EXTREMITY COMPARISON IMAGING STUDY: ICD-10-CM

## 2024-12-18 PROCEDURE — 20610 DRAIN/INJ JOINT/BURSA W/O US: CPT | Performed by: PHYSICIAN ASSISTANT

## 2024-12-18 PROCEDURE — 99214 OFFICE O/P EST MOD 30 MIN: CPT | Performed by: PHYSICIAN ASSISTANT

## 2024-12-18 PROCEDURE — 73564 X-RAY EXAM KNEE 4 OR MORE: CPT | Performed by: PHYSICIAN ASSISTANT

## 2024-12-18 PROCEDURE — 73562 X-RAY EXAM OF KNEE 3: CPT | Performed by: PHYSICIAN ASSISTANT

## 2024-12-18 RX ORDER — TRIAMCINOLONE ACETONIDE 40 MG/ML
40 INJECTION, SUSPENSION INTRA-ARTICULAR; INTRAMUSCULAR ONCE
Status: COMPLETED | OUTPATIENT
Start: 2024-12-18 | End: 2024-12-18

## 2024-12-18 RX ADMIN — TRIAMCINOLONE ACETONIDE 40 MG: 40 INJECTION, SUSPENSION INTRA-ARTICULAR; INTRAMUSCULAR at 11:08:00

## 2024-12-18 NOTE — PROGRESS NOTES
\[facility] New Patient Note    CC: Right knee pain    HPI: This 39 year old female presents today with complaints of right knee pain.  She is 8 weeks postpartum.  Onset of symptoms started approximately 3 to 4 months ago during the end of her pregnancy.  It has progressed and pain is localized to the anterior aspect of the knee.  She notes associated swelling and pain that is worse with weightbearing activities especially getting in and out of a car and going up and down stairs.  She was seen and evaluated at the immediate care which time she was given a Medrol Dosepak.  She has not taking it as she has been breast-feeding.  She does note occasional catching clicking and giving way.  She denies any injury or significant history of knee pain or surgery.  She does take Tylenol occasionally for pain.  She is here for further evaluation.    Past Medical History:    Ectopic pregnancy (HCC)    History of loop electrical excision procedure (LEEP)        History of multiple miscarriages    3     Past Surgical History:   Procedure Laterality Date    D & c      Miscarried    Leep       Current Outpatient Medications   Medication Sig Dispense Refill    methylPREDNISolone (MEDROL) 4 MG Oral Tablet Therapy Pack Dosepack: take as directed (Patient not taking: Reported on 2024) 1 each 0     Allergies[1]  Family History   Problem Relation Age of Onset    Cancer Mother         High Blood Pressure    Breast Cancer Maternal Grandmother 65    Cancer Maternal Grandmother         Bladder Cancer and Breast Cancer    Cancer Maternal Grandfather 45         age 45 Stomach Cancer     Social History     Occupational History    Not on file   Tobacco Use    Smoking status: Never    Smokeless tobacco: Never   Vaping Use    Vaping status: Never Used   Substance and Sexual Activity    Alcohol use: Not Currently     Comment: Stopped Social Drinking 10/6/2022    Drug use: Never    Sexual activity: Not on file         ROS:  Complete review of symptoms was reviewed and is non-contributory to the chief complaint as mentioned above.      Physical Exam: She is a pleasant 39-year-old female in no acute distress.  Ambulates with mild antalgia.  Exam of the right knee and lower extremity reveals that the overlying skin is intact.  She has a mild effusion.  She has mild patellofemoral crepitus with range of motion.  She lacks 2 degrees of full extension.  She has pain with flexion to 80 degrees.  She is tender about the medial joint line.  She also is tender about the lateral joint line.  Mild discomfort with Steinmann and Janette.  Sensation is present to light touch.  Stable ligamentous exam.        Imaging: I personally viewed, independently interpreted and radiology report read.  They show:  XR KNEE (3 VIEWS), LEFT (CPT=73562)    Result Date: 12/18/2024  CONCLUSION:  No acute fractures.  Minimal osteoarthritic changes.   LOCATION:  [facility]   Dictated by (CST): Jamaal Coy MD on 12/18/2024 at 10:18 AM     Finalized by (CST): Jamaal Coy MD on 12/18/2024 at 10:18 AM       XR KNEE, COMPLETE (4 OR MORE VIEWS), RIGHT (CPT=73564)    Result Date: 12/18/2024  CONCLUSION:  No acute osseous findings.   LOCATION:  [facility]   Dictated by (CST): Jamaal Coy MD on 12/18/2024 at 10:17 AM     Finalized by (CST): Jamaal Coy MD on 12/18/2024 at 10:18 AM       US VENOUS DOPPLER LEG RIGHT - DIAG IMG (CPT=93971)    Result Date: 12/13/2024  CONCLUSION:  No DVT right lower extremity.   LOCATION:  [facility]    Dictated by (CST): Juan Piper MD on 12/13/2024 at 9:35 PM     Finalized by (CST): Juan Piper MD on 12/13/2024 at 9:37 PM              Assessment: Right knee effusion and chondromalacia patella       Plan: I discussed x-ray and exam findings with the patient today are consistent with right knee effusion and chondromalacia patella.  There are no obvious signs or symptoms of internal derangement and I recommend continued  conservative treatment including oral anti-inflammatories as tolerated, ice, elevation and compression.  We also discussed the option for an aspiration and cortisone injection today.  I recommend that she follow-up with her for pediatrician for any restrictions and breast-feeding and pumping after the cortisone injection.  She would like to proceed with the procedure today and understands that she may have to pump and discard the breastmilk if indicated by the pediatrician.  I explained that there should be minimal risk as the intra-articular cortisone injection has minimal reabsorption due to the local nature of the procedure.  If symptoms are not improving or are recurrent over the next 2 to 4 weeks, further imaging with an MRI scan may be considered at that time.  She will contact our office to discuss how she is doing or follow-up with any worsening symptoms questions or concerns in the interim.    Procedure: Risk, benefits, and alternatives to the aspiration and cortisone injection including but not limited to risk of needle infection, flareup, and failure to improve was discussed.  She would like to proceed under meticulous sterile technique, 4 cc Xylocaine was used to anesthetize the lateral patellofemoral joint of the right knee.  Then through an 18-gauge needle, 25cc of clear serosanguineous fluid was aspirated.  Through the same 18-gauge needle, 4 cc of Xylocaine and 40 mg of Kenalog was injected with free flow fluid.  A Band-Aid was applied.  An Ace wrap wrap was also applied.  Advised to follow-up with any adverse reactions.          Sherlyn Amador PA-C  Orthopedic Surgery   [address] [place] [id number]   t: [phone]  f: [phone]           This document was partially prepared using Dragon Medical voice recognition software.  Although every attempt is made to correct errors during dictation, discrepancies may still exist. Please contact me with any questions or clarifications.        [1] No Known Allergies

## 2025-01-14 ENCOUNTER — HOSPITAL ENCOUNTER (EMERGENCY)
Age: 40
Discharge: HOME | End: 2025-01-14
Attending: EMERGENCY MEDICINE
Payer: COMMERCIAL

## 2025-01-14 ENCOUNTER — APPOINTMENT (OUTPATIENT)
Dept: GENERAL RADIOLOGY | Age: 40
End: 2025-01-14
Attending: EMERGENCY MEDICINE
Payer: COMMERCIAL

## 2025-01-14 VITALS
BODY MASS INDEX: 39.15 KG/M2 | DIASTOLIC BLOOD PRESSURE: 63 MMHG | TEMPERATURE: 98 F | RESPIRATION RATE: 18 BRPM | WEIGHT: 235 LBS | HEIGHT: 65 IN | HEART RATE: 94 BPM | SYSTOLIC BLOOD PRESSURE: 98 MMHG | OXYGEN SATURATION: 98 %

## 2025-01-14 DIAGNOSIS — M54.50 ACUTE RIGHT-SIDED LOW BACK PAIN WITHOUT SCIATICA: Primary | ICD-10-CM
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
## 2025-01-14 PROCEDURE — 99284 EMERGENCY DEPT VISIT MOD MDM: CPT

## 2025-01-14 PROCEDURE — 99283 EMERGENCY DEPT VISIT LOW MDM: CPT

## 2025-01-14 PROCEDURE — 72100 X-RAY EXAM L-S SPINE 2/3 VWS: CPT | Performed by: EMERGENCY MEDICINE

## 2025-01-14 RX ORDER — NAPROXEN 250 MG/1
500 TABLET ORAL ONCE
Status: COMPLETED | OUTPATIENT
Start: 2025-01-14 | End: 2025-01-14

## 2025-01-14 NOTE — DISCHARGE INSTRUCTIONS
You were evaluated in the Emergency Department today for your back pain. Your evaluation did not show signs of medical conditions requiring emergent intervention at this time.    We recommend you take 600mg ibuprofen every 6 hours or tylenol 650mg every 6 hours as needed for pain. If needed, you can alternate these medications so that you take one medication every 3 hours. For instance, at noon take ibuprofen, then at 3pm take tylenol, then at 6pm take ibuprofen.  Apply lidocaine patch to the effected area.  He can also buy heat or ice.  Do some gentle stretching as well.    Return to the Emergency Department if you experience worsening back pain, difficulty walking, fevers, numbness, tingling, incontinence, or any other concerning symptoms.    Please schedule an appointment for follow-up with your primary care physician this week for further evaluation of your symptoms.
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
## 2025-01-16 ENCOUNTER — OFFICE VISIT (OUTPATIENT)
Dept: ORTHOPEDICS CLINIC | Facility: CLINIC | Age: 40
End: 2025-01-16
Payer: MEDICAID

## 2025-01-16 VITALS — BODY MASS INDEX: 39.15 KG/M2 | WEIGHT: 235 LBS | HEIGHT: 65 IN

## 2025-01-16 DIAGNOSIS — M22.41 CHONDROMALACIA PATELLAE OF RIGHT KNEE: Primary | ICD-10-CM

## 2025-01-16 PROCEDURE — 99213 OFFICE O/P EST LOW 20 MIN: CPT | Performed by: PHYSICIAN ASSISTANT
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
## 2025-01-22 ENCOUNTER — PATIENT MESSAGE (OUTPATIENT)
Dept: ORTHOPEDICS CLINIC | Facility: CLINIC | Age: 40
End: 2025-01-22

## 2025-01-22 DIAGNOSIS — M25.461 EFFUSION, RIGHT KNEE: ICD-10-CM

## 2025-01-22 DIAGNOSIS — M22.41 CHONDROMALACIA PATELLAE OF RIGHT KNEE: Primary | ICD-10-CM

## 2025-01-23 NOTE — TELEPHONE ENCOUNTER
LOV 1/16/25, right knee chondromalacia    Per OV note:    \"I recommend continued conservative treatment including oral anti-inflammatories as tolerated including ibuprofen. She is breast-feeding. She will also start quad stretching exercises on her own. If symptoms are not improving or progressive over the next 2 to 4 weeks, advanced imaging with an MRI scan of the right knee may be considered at that time.\"    Additional information requested to document what has been tried since appt.

## 2025-02-12 ENCOUNTER — PATIENT MESSAGE (OUTPATIENT)
Dept: ORTHOPEDICS CLINIC | Facility: CLINIC | Age: 40
End: 2025-02-12

## 2025-02-12 ENCOUNTER — TELEPHONE (OUTPATIENT)
Dept: CASE MANAGEMENT | Age: 40
End: 2025-02-12

## 2025-02-12 ENCOUNTER — PATIENT MESSAGE (OUTPATIENT)
Dept: CASE MANAGEMENT | Age: 40
End: 2025-02-12

## 2025-02-12 NOTE — TELEPHONE ENCOUNTER
MRI Right Knee        Status: DENIED        Reference number [id number]     A copy of the denial letter is filed under the MEDIA tab, reference for complete details. You may reach out to Tania at [phone] to discuss decision.     Please reach out to patient with plan of care.      Thank you

## 2025-02-12 NOTE — TELEPHONE ENCOUNTER
Please see denial information under Media tab.  Please advise on next steps for pt.  Currently still scheduled for MRI

## 2025-03-19 ENCOUNTER — HOSPITAL ENCOUNTER (OUTPATIENT)
Dept: MRI IMAGING | Age: 40
Discharge: HOME | End: 2025-03-19
Attending: PHYSICIAN ASSISTANT
Payer: MEDICAID

## 2025-03-19 DIAGNOSIS — M25.461 EFFUSION, RIGHT KNEE: ICD-10-CM

## 2025-03-19 DIAGNOSIS — M22.41 CHONDROMALACIA PATELLAE OF RIGHT KNEE: ICD-10-CM

## 2025-03-19 PROCEDURE — 73721 MRI JNT OF LWR EXTRE W/O DYE: CPT | Performed by: PHYSICIAN ASSISTANT

## 2025-06-08 ENCOUNTER — APPOINTMENT (OUTPATIENT)
Dept: GENERAL RADIOLOGY | Age: 40
End: 2025-06-08
Attending: EMERGENCY MEDICINE
Payer: MEDICAID

## 2025-06-08 ENCOUNTER — HOSPITAL ENCOUNTER (EMERGENCY)
Age: 40
Discharge: HOME | End: 2025-06-08
Attending: EMERGENCY MEDICINE
Payer: MEDICAID

## 2025-06-08 VITALS
SYSTOLIC BLOOD PRESSURE: 138 MMHG | RESPIRATION RATE: 16 BRPM | BODY MASS INDEX: 37.49 KG/M2 | OXYGEN SATURATION: 98 % | HEART RATE: 93 BPM | HEIGHT: 65 IN | DIASTOLIC BLOOD PRESSURE: 85 MMHG | WEIGHT: 225 LBS | TEMPERATURE: 98 F

## 2025-06-08 DIAGNOSIS — M65.90 TENOSYNOVITIS: Primary | ICD-10-CM

## 2025-06-08 PROCEDURE — 99284 EMERGENCY DEPT VISIT MOD MDM: CPT

## 2025-06-08 PROCEDURE — 29130 APPL FINGER SPLINT STATIC: CPT

## 2025-06-08 PROCEDURE — 73130 X-RAY EXAM OF HAND: CPT | Performed by: EMERGENCY MEDICINE

## 2025-06-08 PROCEDURE — 99283 EMERGENCY DEPT VISIT LOW MDM: CPT

## 2025-06-08 RX ORDER — METHYLPREDNISOLONE 4 MG/1
TABLET ORAL
Qty: 1 EACH | Refills: 0 | Status: SHIPPED | OUTPATIENT
Start: 2025-06-08

## 2025-06-08 RX ORDER — HYDROCODONE BITARTRATE AND ACETAMINOPHEN 5; 325 MG/1; MG/1
1-2 TABLET ORAL EVERY 6 HOURS PRN
Qty: 10 TABLET | Refills: 0 | Status: SHIPPED | OUTPATIENT
Start: 2025-06-08 | End: 2025-06-13

## 2025-06-08 NOTE — ED INITIAL ASSESSMENT (HPI)
Right hand swelling/tingling/aches for one month, using hot packs, no injury, tingling from R 2nd finger to hand

## 2025-06-23 ENCOUNTER — TELEPHONE (OUTPATIENT)
Dept: FAMILY MEDICINE CLINIC | Facility: CLINIC | Age: 40
End: 2025-06-23

## 2025-06-23 ENCOUNTER — OFFICE VISIT (OUTPATIENT)
Age: 40
End: 2025-06-23
Payer: MEDICAID

## 2025-06-23 DIAGNOSIS — Z02.9 ADMINISTRATIVE ENCOUNTER: Primary | ICD-10-CM

## 2025-06-23 DIAGNOSIS — M65.321 TRIGGER INDEX FINGER OF RIGHT HAND: Primary | ICD-10-CM

## 2025-06-23 RX ORDER — BETAMETHASONE SODIUM PHOSPHATE AND BETAMETHASONE ACETATE 3; 3 MG/ML; MG/ML
6 INJECTION, SUSPENSION INTRA-ARTICULAR; INTRALESIONAL; INTRAMUSCULAR; SOFT TISSUE ONCE
Status: COMPLETED | OUTPATIENT
Start: 2025-06-23 | End: 2025-06-23

## 2025-06-23 RX ORDER — PANTOPRAZOLE SODIUM 40 MG/1
40 TABLET, DELAYED RELEASE ORAL
COMMUNITY
Start: 2025-06-18 | End: 2026-06-18

## 2025-06-23 RX ADMIN — BETAMETHASONE SODIUM PHOSPHATE AND BETAMETHASONE ACETATE 6 MG: 3; 3 INJECTION, SUSPENSION INTRA-ARTICULAR; INTRALESIONAL; INTRAMUSCULAR; SOFT TISSUE at 10:14:00

## 2025-06-23 NOTE — PROGRESS NOTES
Clinic Note     Allergies[1]    CC: right Index finger triggering    HPI: 39 year old RHD female presents with triggering of the above. Ongoing since May 2025. No treatment thus far. She has painful locking and catching episodes of the above digit.      Past Medical History[2]  Past Surgical History[3]  Current Medications[4]  Family History[5]  Social History     Occupational History    Not on file   Tobacco Use    Smoking status: Never    Smokeless tobacco: Never   Vaping Use    Vaping status: Never Used   Substance and Sexual Activity    Alcohol use: Not Currently     Comment: Stopped Social Drinking 10/6/2022    Drug use: Never    Sexual activity: Not on file        Review of Systems:  Negative unless stated in HPI.    Physical Exam:      Bilateral Upper Extremity:   Inspection: Skin Intact. No skin lesions. No gross deformity.   Palpation: TTP over A1 pulley of right Index finger   Motion: Elbow: normal bilateral symmetric ext/flex  Wrist: normal bilateral symmetric ext/flex/sup/pro  Finger: normal in all digits   Special Tests: + active triggering/subtle catch of affected finger. no IPJ contracture. Normally sensate digit. Normally perfused digit.       Assessment/Plan:  39 year old female with right Index finger triggering.    I reviewed the patient's electronic medical record, including the pertinent office notes, medical/surgical history, medications and images.     Triggering of right Index finger -  I discussed with the patient surgical and non-surgical treatment options and their success rates/risks. Using a shared decision-making process, patient elected to proceed with a corticosteroid injection and OT referral.  CSI #1 was performed today for this digit.  OT referral provided    Follow Up:  6-8 weeks, okay to cancel appointment if symptoms are 100% resolved      Procedure:  The patient was indicated for a corticosteroid injection based on clinical exam, history and continued symptoms. The benefits,  risks and alternatives as well as expected outcomes were explained to the patient. Specific considerations discussed included: pain at the injection site, incomplete relief or temporary relief of symptoms, infection, allergic reaction, skin discoloration, recurrence of pain requiring repeat injection or surgical intervention, tendon or ligament rupture or degeneration, and fat atrophy. Written consent was obtained.  Skin was prepped with ChloraPrep.  1 mL of 6 mg betamethasone and 1 mL of 1% lidocaine was injected into the subcutaneous tissue overlying the A1 pulley of right Index finger. Patient tolerated the procedure well.  No complications were encountered.  Band-Aid was applied.    Curtis Tamayo MD   Hand, Wrist, & Elbow Surgery  [email or web address]         [1] No Known Allergies  [2]   Past Medical History:   Ectopic pregnancy (HCC)    History of loop electrical excision procedure (LEEP)        History of multiple miscarriages    3   [3]   Past Surgical History:  Procedure Laterality Date    D & c      Miscarried    Leep     [4]   Current Outpatient Medications   Medication Sig Dispense Refill    pantoprazole 40 MG Oral Tab EC Take 1 tablet (40 mg total) by mouth.      methylPREDNISolone (MEDROL) 4 MG Oral Tablet Therapy Pack Dosepack: take as directed (Patient not taking: Reported on 2025) 1 each 0    methylPREDNISolone (MEDROL) 4 MG Oral Tablet Therapy Pack Dosepack: take as directed (Patient not taking: Reported on 2025) 1 each 0   [5]   Family History  Problem Relation Age of Onset    Cancer Mother         High Blood Pressure    Breast Cancer Maternal Grandmother 65    Cancer Maternal Grandmother         Bladder Cancer and Breast Cancer    Cancer Maternal Grandfather 45         age 45 Stomach Cancer

## 2025-06-24 ENCOUNTER — TELEPHONE (OUTPATIENT)
Dept: PHYSICAL THERAPY | Age: 40
End: 2025-06-24

## 2025-06-24 NOTE — TELEPHONE ENCOUNTER
Please call patient and verify she has a new PCP and if so please do PCP removal.  If patient does not have a new PCP, please instruct patient to schedule appointment to see me for her annual wellness visit.

## 2025-06-24 NOTE — TELEPHONE ENCOUNTER
Patient has new pcp    Tia Whitman MD   [address] [facility]   Internal Medicine, [address]A, 3rd Floor   [place], [place] [id number] [phone] (Work)   [phone] (Fax)     PCP removal sent.

## 2025-06-27 ENCOUNTER — PATIENT OUTREACH (OUTPATIENT)
Dept: CASE MANAGEMENT | Age: 40
End: 2025-06-27

## 2025-06-27 NOTE — PROCEDURES
The office order for PCP removal request is Approved and finalized on June 27, 2025.    Removed Radha Ames DO as the patient's Primary Care Physician

## 2025-07-02 ENCOUNTER — TELEPHONE (OUTPATIENT)
Dept: PHYSICAL THERAPY | Age: 40
End: 2025-07-02

## 2025-07-23 ENCOUNTER — PATIENT MESSAGE (OUTPATIENT)
Facility: CLINIC | Age: 40
End: 2025-07-23

## 2025-07-24 NOTE — TELEPHONE ENCOUNTER
LOV 6/23/25  DOS n/a    Right index finger triggering and swelling.   Was to return in 6-8 weeks if completely resolved. No follow up scheduled.    OT was unable to reach pt or leave VM, x2.    Pt reports finger is painful, stiff, photo shows significant swelling. Tender to touch.  Flare up started 2 weeks ago, has been worsening.  Swelling worse by evening.  Pt reports has been doing home exercises, icing, and heat pads. No improvement.    Encouraged to schedule OT and follow up visit with Dr Tamayo.  Instructions given for addressing and reducing risk for swelling at fingers.

## (undated) NOTE — LETTER
Date & Time: 1/14/2025, 3:59 PM  Patient: Merlyn Baldwin  Encounter Provider(s):    Merlyn Rubio,        To Whom It May Concern:    Merlyn Baldwin was seen and treated in our department on 1/14/2025. She should not return to work until 1/17 .    If you have any questions or concerns, please do not hesitate to call.        _____________________________  Physician/APC Signature

## (undated) NOTE — LETTER
ASTHMA ACTION PLAN for Mishel Clarke     : 1985     Date: 23  Doctor:  Richard Chatman DO  Phone for doctor or clinic: [facility], [facility]  [address]  [address]  [phone]      ACT Score: 25    ACT Goal: 20 or greater    Call your provider if you require your rescue/quick reliever medication more than 2-3 times in a 24 hour period. If you require your rescue inhaler/medication more than 2-3 times weekly, your asthma may not be under proper control and you should seek medical attention. *Quick Relievers are Xopenex and Albuterol*    You can use the colors of a traffic light to help learn about your asthma medicines. Seasonal       1. Green - Go! % of Personal Best Peak Flow   Use controller medicine. Breathing is good  No cough or wheeze  Can work and play Medicine How much to take When to take it    Medications       Sympathomimetics Instructions     albuterol 108 (90 Base) MCG/ACT Inhalation Aero Soln    albuterol sulfate HFA 90 mcg/actuation aerosol inhaler   INHALE 2 PUFFS BY MOUTH EVERY 4 HOURS AS NEEDED                    2. Yellow - Caution. 50-79% Personal Best Peak Flow  Use reliever medicine to keep an asthma attack from getting bad. Cough  Quick Relievers  Wheezing  Tight Chest  Wake up at night Medicine How much to take When to take it    If symptoms are not improving in 24-48 hrs, call office for further instructions  Medications       Sympathomimetics Instructions     albuterol 108 (90 Base) MCG/ACT Inhalation Aero Soln    albuterol sulfate HFA 90 mcg/actuation aerosol inhaler   INHALE 2 PUFFS BY MOUTH EVERY 4 HOURS AS NEEDED                    3. Red - Stop!  Danger! <50% Personal Best Peak Flow  Continue Controller Medications But ADD:   Medicine not helping  Breathing is hard and fast  Nose opens wide  Can't walk  Ribs show  Can't talk well Medicine How much to take When to take it    If your symptoms do not improve in ONE hour -  go to the emergency room or call 911 immediately! If symptoms improve, call office for appointment immediately. Albuterol nebulizer 1 vial (2.5mg/3ml) every 20 minutes for two treatments       Don't forget:  Rinse mouth after using inhaler  Use spacer for inhaler  Remember to get your Flu vaccine every fall! [x] Asthma Action Plan reviewed with the caregiver and patient, and a copy of the plan was given to the patient/caregiver. [x] Asthma Action Plan reviewed with the caregiver and patient on the phone, and copy mailed to patient/caregiver or sent via [address]      Signatures:   Provider  Chandrika Chávez, DO Patient  [address] [facility]

## (undated) NOTE — LETTER
To Whom It May Concern:    Merlyn Baldwin is currently under my medical care and she may return to work on 01.19.2024.    To whom it may concern:      Would be best if she wears an athletic shoe at work indefinitely - time period.     If you require additional information please contact our office.    Sincerely,    RAMONE Alves MA

## (undated) NOTE — LETTER
AUTHORIZATION FOR SURGICAL OPERATION OR OTHER PROCEDURE    1. I hereby authorize Dr.Krishna Tamayo, and [facility] staff assigned to my case to perform the following operation and/or procedure at the [facility] site:    _______________________________________________________________________________________________    Right index finger cortisone injection   _______________________________________________________________________________________________    2.  My physician has explained the nature and purpose of the operation or other procedure, possible alternative methods of treatment, the risks involved, and the possibility of complication to me.  I acknowledge that no guarantee has been made as to the result that may be obtained.  3.  I recognize that, during the course of this operation, or other procedure, unforseen conditions may necessitate additional or different procedure than those listed above.  I, therefore, further authorize and request that the above named physician, his/her physician assistants or designees perform such procedures as are, in his/her professional opinion, necessary and desirable.  4.  Any tissue or organs removed in the operation or other procedure may be disposed of by and at the discretion of the [facility] and [facility].  5.  I understand that in the event of a medical emergency, I will be transported by local paramedics to [facility] or other hospital emergency department.  6.  I certify that I have read and fully understand the above consent to operation and/or other procedure.    7.  I acknowledge that my physician has explained sedation/analgesia administration to me including the risks and benefits.  I consent to the administration of sedation/analgesia as may be necessary or desirable in the judgement of my physician.    Witness signature: ___________________________________________________ Date:   ______/______/_____                    Time:  ________ A.M.  P.M.       Patient Name:  ______________________________________________________  (please print)      Patient signature:  ___________________________________________________             Relationship to Patient:           []  Parent    Responsible person                          []  Spouse  In case of minor or                    [] Other  _____________   Incompetent name:  __________________________________________________                               (please print)      _____________      Responsible person  In case of minor or  Incompetent signature:  _______________________________________________    Statement of Physician  My signature below affirms that prior to the time of the procedure, I have explained to the patient and/or his/her guardian, the risks and benefits involved in the proposed treatment and any reasonable alternative to the proposed treatment.  I have also explained the risks and benefits involved in the refusal of the proposed treatment and have answered the patient's questions.                        Date:  ______/______/_______  Provider                      Signature:  __________________________________________________________       Time:  ___________ A.M    P.M.

## (undated) NOTE — LETTER
2/8/2024              Merlyn Baldwin        [facility] [facility] [address]         Dear Merlyn,    Our records indicate that the tests ordered for you by KEEGAN Wylie  have not been done.    US LIVER (CPT=[id number]) (Order #[id number]) on 10/30/23   If you have, in fact, already completed the tests or you do not wish to have the tests done, please contact our office at THE NUMBER LISTED BELOW.  Otherwise, please proceed with the testing.          Sincerely,    KEEGAN Wylie  [address]  [facility] [id number] [phone]